# Patient Record
Sex: MALE | Race: WHITE | NOT HISPANIC OR LATINO | Employment: FULL TIME | URBAN - METROPOLITAN AREA
[De-identification: names, ages, dates, MRNs, and addresses within clinical notes are randomized per-mention and may not be internally consistent; named-entity substitution may affect disease eponyms.]

---

## 2017-10-04 ENCOUNTER — TELEPHONE (OUTPATIENT)
Dept: CARDIOLOGY | Facility: CLINIC | Age: 57
End: 2017-10-04

## 2017-10-04 NOTE — TELEPHONE ENCOUNTER
----- Message from Frantz Pedro sent at 10/4/2017 10:32 AM CDT -----  Contact: Self/ [phone]  Patient would like to be seen for a sooner appointment due to he has artery blockage he was referred by Dr. Koo.    Please call and advise.

## 2017-10-04 NOTE — TELEPHONE ENCOUNTER
Left detailed message, we can accommodate an office visit tomorrow in the [place] office.     I asked patient to call me back to the [facility] office.

## 2017-11-15 ENCOUNTER — OFFICE VISIT (OUTPATIENT)
Dept: CARDIOLOGY | Facility: CLINIC | Age: 57
End: 2017-11-15
Payer: COMMERCIAL

## 2017-11-15 VITALS
BODY MASS INDEX: 29.77 KG/M2 | DIASTOLIC BLOOD PRESSURE: 80 MMHG | HEART RATE: 62 BPM | HEIGHT: 69 IN | SYSTOLIC BLOOD PRESSURE: 160 MMHG | WEIGHT: 201 LBS

## 2017-11-15 DIAGNOSIS — E78.5 HYPERLIPIDEMIA ASSOCIATED WITH TYPE 2 DIABETES MELLITUS: ICD-10-CM

## 2017-11-15 DIAGNOSIS — I25.10 CORONARY ARTERY DISEASE INVOLVING NATIVE CORONARY ARTERY OF NATIVE HEART WITHOUT ANGINA PECTORIS: ICD-10-CM

## 2017-11-15 DIAGNOSIS — E11.9 TYPE 2 DIABETES MELLITUS WITHOUT COMPLICATION, WITHOUT LONG-TERM CURRENT USE OF INSULIN: ICD-10-CM

## 2017-11-15 DIAGNOSIS — I70.213 ATHEROSCLEROSIS OF NATIVE ARTERY OF BOTH LOWER EXTREMITIES WITH INTERMITTENT CLAUDICATION: Primary | ICD-10-CM

## 2017-11-15 DIAGNOSIS — R09.89 BILATERAL CAROTID BRUITS: ICD-10-CM

## 2017-11-15 DIAGNOSIS — I10 ESSENTIAL HYPERTENSION: ICD-10-CM

## 2017-11-15 DIAGNOSIS — R01.1 CARDIAC MURMUR: ICD-10-CM

## 2017-11-15 DIAGNOSIS — I73.9 PAD (PERIPHERAL ARTERY DISEASE): ICD-10-CM

## 2017-11-15 DIAGNOSIS — E11.69 HYPERLIPIDEMIA ASSOCIATED WITH TYPE 2 DIABETES MELLITUS: ICD-10-CM

## 2017-11-15 PROCEDURE — 99999 PR PBB SHADOW E&M-EST. PATIENT-LVL II: CPT | Mod: PBBFAC,,, | Performed by: INTERNAL MEDICINE

## 2017-11-15 PROCEDURE — 99215 OFFICE O/P EST HI 40 MIN: CPT | Mod: S$GLB,,, | Performed by: INTERNAL MEDICINE

## 2017-11-15 PROCEDURE — 93000 ELECTROCARDIOGRAM COMPLETE: CPT | Mod: S$GLB,,, | Performed by: INTERNAL MEDICINE

## 2017-11-15 RX ORDER — FERROUS SULFATE 325(65) MG
325 TABLET ORAL
COMMUNITY

## 2017-11-15 RX ORDER — CILOSTAZOL 50 MG/1
50 TABLET ORAL 2 TIMES DAILY
Qty: 180 TABLET | Refills: 3 | Status: SHIPPED | OUTPATIENT
Start: 2017-11-15 | End: 2018-11-14 | Stop reason: SDUPTHER

## 2017-11-15 RX ORDER — MULTIVITAMIN
1 TABLET ORAL DAILY
COMMUNITY

## 2017-11-15 RX ORDER — ATORVASTATIN CALCIUM 40 MG/1
40 TABLET, FILM COATED ORAL NIGHTLY
Refills: 3 | COMMUNITY
Start: 2017-08-12

## 2017-11-15 RX ORDER — ASPIRIN 81 MG/1
81 TABLET ORAL DAILY
COMMUNITY

## 2017-11-15 RX ORDER — AMLODIPINE AND BENAZEPRIL HYDROCHLORIDE 5; 20 MG/1; MG/1
1 CAPSULE ORAL DAILY
Refills: 2 | COMMUNITY
Start: 2017-10-03 | End: 2018-08-09 | Stop reason: SDUPTHER

## 2017-11-15 RX ORDER — CARVEDILOL 6.25 MG/1
6.25 TABLET ORAL 2 TIMES DAILY
Refills: 3 | COMMUNITY
Start: 2017-08-12 | End: 2018-08-09 | Stop reason: DRUGHIGH

## 2017-11-15 RX ORDER — CLOPIDOGREL BISULFATE 75 MG/1
75 TABLET ORAL DAILY
Refills: 1 | Status: ON HOLD | COMMUNITY
Start: 2017-08-19 | End: 2018-06-29 | Stop reason: HOSPADM

## 2017-11-15 RX ORDER — LANOLIN ALCOHOL/MO/W.PET/CERES
100 CREAM (GRAM) TOPICAL DAILY
COMMUNITY

## 2017-11-15 NOTE — PATIENT INSTRUCTIONS
Taking Aspirin for Atherosclerosis       Aspirin is a medicine often prescribed to treat atherosclerosis. This condition affects your arteries. These are the blood vessels that carry blood away from your heart. Having atherosclerosis means youre at greater risk of a heart attack or stroke. Aspirin can help prevent these from happening.  How atherosclerosis affects your arteries   A fatty material (plaque) can build up in your arteries. This makes it harder for blood to flow through them. A blood clot can then form on the plaque. This may block the artery, cutting off blood flow. This can cause conditions such as coronary artery disease (CAD) and peripheral arterial disease (PAD):  · CAD occurs when plaque builds up in the coronary artery. This artery supplies the heart with oxygen-rich blood.  · PAD occurs when plaque forms in leg arteries.  The same things that cause CAD and PAD can also cause plaque to form in other arteries in your body, such as those in the brain. When plaque occurs in any of these arteries, it raises your risk of heart attack or stroke.  What aspirin does  Aspirin is a blood-thinner (antiplatelet medicine). It helps keep blood clots from forming. This reduces the risk of blockage. Aspirin can be taken daily if you are at high risk of or have already had a heart attack or stroke. It is also used after a procedure called a stent placement. This is when a tiny wire mesh tube, or stent, is placed in an artery to keep it open. Aspirin helps prevent blood clots from forming on the stent.  Taking aspirin safely  Tell your healthcare provider about any medicines you are taking. This includes:  · All prescription medicines  · Over-the-counter medicines  · Herbs, vitamins, and other supplements  Also mention if you have a history of ulcers or bleeding problems. Ask whether you will need to stop taking aspirin before having surgery or dental work. Always take medicines as directed.  Tips for taking  aspirin  · Have a routine. For example, take aspirin with the same meal each day.  · Dont take more than prescribed. A low dose gives the same benefit as a higher one, with a lower risk of side effects.  · Dont skip doses. Aspirin needs to be taken each day to work well.  · Keep track of what you take. A pillbox with days of the week can help, especially if you take several medicines. Or use a list or chart to keep track.  When to call your healthcare provider  Side effects of aspirin are not usually serious. If you do have problems, changing your dose may help. Call your healthcare provider if you have any of the following:  · Excessive bruising (some bruising is normal)  · Nosebleeds, bleeding gums, or other excessive bleeding  · An upset stomach or stomach pain   Date Last Reviewed: 6/1/2016  © 0253-6362 [employer]. [address] [place] [address], [place] [address]. All rights reserved. This information is not intended as a substitute for professional medical care. Always follow your healthcare professional's instructions.

## 2017-11-15 NOTE — PROGRESS NOTES
Subjective:   Patient ID:  Shimon Cruz is a 57 y.o. male who presents for evaluation and treatment of Peripheral Arterial Disease; Claudication; Hyperlipidemia; and Hypertension      HPI:       He is here by recommendation of Dr. Ruiz for worsening lifestyle limiting len IIb claudication over the past year. No ulcers. No rest pain. No overt hair loss. He works at [employer]. He has a previous history of R leg intervention in 2008 at [facility] (? SFA and POP). He also has a h/o single vessel PCI in 2008 (unknown vessel). No angina. No TIA/CVA. He takes aspirin and plavix for DAPT. He takes statin and acei.       ECG today: NSR    Arterial ultrasound done at [place] 9/2017   No films available for review      Report states:     Bilateral CFA disease with monophasic bilateral SFA velocities    Bilateral PT high grade disease      Care team:     Dr. JULISSA Ruiz (PCP)        Patient Active Problem List    Diagnosis Date Noted    Coronary artery disease involving native coronary artery of native heart without angina pectoris 11/15/2017         S/P PCI in 2008  Unknown vessel  [facility]      Essential hypertension 11/15/2017    Type 2 diabetes mellitus without complication, without long-term current use of insulin 11/15/2017    Hyperlipidemia associated with type 2 diabetes mellitus 11/15/2017    Atherosclerosis of native artery of both lower extremities with intermittent claudication 11/15/2017    PAD (peripheral artery disease) 11/15/2017         S/p R leg PTAS in 2008  [facility]        Bilateral carotid bruits 11/15/2017    Cardiac murmur 11/15/2017           Right Arm BP - Sitting: 160/80  Left Arm BP - Sitting: 160/80                Review of Systems   Constitution: Negative for diaphoresis, weakness, night sweats, weight gain and weight loss.   HENT: Negative for congestion.    Eyes: Negative for blurred vision, discharge and double vision.   Cardiovascular: Positive for claudication. Negative  for chest pain, cyanosis, dyspnea on exertion, irregular heartbeat, leg swelling, near-syncope, orthopnea, palpitations, paroxysmal nocturnal dyspnea and syncope.   Respiratory: Negative for cough, shortness of breath and wheezing.    Endocrine: Negative for cold intolerance, heat intolerance and polyphagia.   Hematologic/Lymphatic: Negative for adenopathy and bleeding problem. Does not bruise/bleed easily.   Skin: Negative for dry skin and nail changes.   Musculoskeletal: Negative for arthritis, back pain, falls, joint pain, myalgias and neck pain.   Gastrointestinal: Negative for bloating, abdominal pain, change in bowel habit and constipation.   Genitourinary: Negative for bladder incontinence, dysuria, flank pain, genital sores and missed menses.   Neurological: Negative for aphonia, brief paralysis, difficulty with concentration and dizziness.   Psychiatric/Behavioral: Negative for altered mental status and memory loss. The patient does not have insomnia.    Allergic/Immunologic: Negative for environmental allergies.       Objective:   Physical Exam   Constitutional: He is oriented to person, place, and time. He appears well-developed and well-nourished. He is not intubated.   HENT:   Head: Normocephalic and atraumatic.   Right Ear: External ear normal.   Left Ear: External ear normal.   Mouth/Throat: Oropharynx is clear and moist.   Eyes: Conjunctivae and EOM are normal. Pupils are equal, round, and reactive to light. Right eye exhibits no discharge. Left eye exhibits no discharge. No scleral icterus.   Neck: Normal range of motion. Neck supple. Normal carotid pulses, no hepatojugular reflux and no JVD present. Carotid bruit is not present. No tracheal deviation present. No thyromegaly present.   Cardiovascular: Normal rate, regular rhythm, S1 normal and S2 normal.   No extrasystoles are present. PMI is not displaced.  Exam reveals no gallop, no S3, no distant heart sounds, no friction rub and no midsystolic  click.    Murmur heard.   Systolic murmur is present with a grade of 2/6  at the upper right sternal border radiating to the neck  Pulses:       Carotid pulses are 2+ on the right side with bruit, and 2+ on the left side with bruit.       Radial pulses are 2+ on the right side, and 2+ on the left side.        Femoral pulses are 2+ on the right side, and 2+ on the left side.       Popliteal pulses are 2+ on the right side, and 2+ on the left side.        Dorsalis pedis pulses are 0 on the right side, and 0 on the left side.        Posterior tibial pulses are 0 on the right side, and 0 on the left side.   Biphasic R PT and monophasic R DP doppler signals     Biphasic L DP and monophasic L PT doppler signals    Pulmonary/Chest: Effort normal and breath sounds normal. No accessory muscle usage or stridor. No apnea, no tachypnea and no bradypnea. He is not intubated. No respiratory distress. He has no decreased breath sounds. He has no wheezes. He has no rales. He exhibits no tenderness and no bony tenderness.   Abdominal: He exhibits no distension, no pulsatile liver, no abdominal bruit, no ascites, no pulsatile midline mass and no mass. There is no tenderness. There is no rebound and no guarding.   Musculoskeletal: Normal range of motion. He exhibits no edema or tenderness.   Lymphadenopathy:     He has no cervical adenopathy.   Neurological: He is alert and oriented to person, place, and time. He has normal reflexes. No cranial nerve deficit. Coordination normal.   Skin: Skin is warm. No rash noted. No erythema. No pallor.   Psychiatric: He has a normal mood and affect. His behavior is normal. Judgment and thought content normal.       Assessment:     1. Atherosclerosis of native artery of both lower extremities with intermittent claudication    2. Coronary artery disease involving native coronary artery of native heart without angina pectoris    3. Essential hypertension    4. Type 2 diabetes mellitus without  complication, without long-term current use of insulin    5. Hyperlipidemia associated with type 2 diabetes mellitus    6. PAD (peripheral artery disease)    7. Bilateral carotid bruits    8. Cardiac murmur        Plan:           Initiate medical therapy for PAD   Add pletal 50 mg bid    Titrate to 100 mg bid if no improvement noted   Continue with walking program      Follow up in 3 months with [facility]  If there is no improvement we will discuss revascularization        Echo to assess EF and valvular disease  Carotid ultrasound to assess patency in view of bruits        Obtain records from Dr. Mathis and [facility]  Obtain ultrasound CD from [facility] for review  Obtain recent labs from PCP       Bring BP log during visit  Check BP at least once a week        Continue with current medical plan and lifestyle changes.  Return sooner for concerns or questions. If symptoms persist go to the ED  I have reviewed all pertinent data on this patient       I have reviewed the patient's medical history in detail and updated the computerized patient record.    Orders Placed This Encounter   Procedures    Radiology US Carotid Bilateral     Standing Status:   Future     Standing Expiration Date:   11/16/2018    CAR Ankle Brachial Indices W Stress     Standing Status:   Future     Standing Expiration Date:   11/15/2018    EKG 12-lead    2D Echo w/ Color Flow Doppler     Standing Status:   Future     Standing Expiration Date:   11/15/2018       Follow up as scheduled. Return sooner for concerns or questions            He expressed verbal understanding and agreed with the plan      Greater than 50% of the visit of 45 minutes was spent counseling, educating, and coordinating the care of the patient.  -In today's visit, at least 4 established conditions that pose a risk to life or bodily function have been addressed and the conditions are severe.    -In today's visit, monitoring for drug toxicity was  accomplished.          Patient's Medications   New Prescriptions    CILOSTAZOL (PLETAL) 50 MG TAB    Take 1 tablet (50 mg total) by mouth 2 (two) times daily.   Previous Medications    AMLODIPINE-BENAZEPRIL 5-20 MG (LOTREL) 5-20 MG PER CAPSULE    Take 1 capsule by mouth once daily.    ASPIRIN (ECOTRIN) 81 MG EC TABLET    Take 81 mg by mouth once daily.    ATORVASTATIN (LIPITOR) 40 MG TABLET    Take 40 mg by mouth every evening.    CARVEDILOL (COREG) 6.25 MG TABLET    Take 6.25 mg by mouth 2 (two) times daily.    CLOPIDOGREL (PLAVIX) 75 MG TABLET    Take 75 mg by mouth once daily.    CYANOCOBALAMIN (VITAMIN B-12) 1000 MCG TABLET    Take 100 mcg by mouth once daily.    FERROUS SULFATE 325 MG (65 MG IRON) TAB TABLET    Take 325 mg by mouth daily with breakfast.    FLUARIX QUAD 2130-4917, PF, 60 MCG (15 MCG X 4)/0.5 ML VACCINE    TO BE ADMINISTERED BY PHARMASIST    MULTIVITAMIN (THERAGRAN) PER TABLET    Take 1 tablet by mouth once daily.   Modified Medications    No medications on file   Discontinued Medications    No medications on file

## 2018-01-02 ENCOUNTER — HOSPITAL ENCOUNTER (OUTPATIENT)
Dept: CARDIOLOGY | Facility: HOSPITAL | Age: 58
Discharge: HOME | End: 2018-01-02
Attending: INTERNAL MEDICINE
Payer: COMMERCIAL

## 2018-01-02 ENCOUNTER — HOSPITAL ENCOUNTER (OUTPATIENT)
Dept: RADIOLOGY | Facility: HOSPITAL | Age: 58
Discharge: HOME | End: 2018-01-02
Attending: INTERNAL MEDICINE
Payer: COMMERCIAL

## 2018-01-02 DIAGNOSIS — R09.89 BILATERAL CAROTID BRUITS: ICD-10-CM

## 2018-01-02 DIAGNOSIS — I70.213 ATHEROSCLEROSIS OF NATIVE ARTERY OF BOTH LOWER EXTREMITIES WITH INTERMITTENT CLAUDICATION: ICD-10-CM

## 2018-01-02 PROCEDURE — 93880 EXTRACRANIAL BILAT STUDY: CPT | Mod: TC,PO

## 2018-01-02 PROCEDURE — 93306 TTE W/DOPPLER COMPLETE: CPT | Mod: PO

## 2018-01-02 PROCEDURE — 93306 TTE W/DOPPLER COMPLETE: CPT | Mod: 26,,, | Performed by: INTERNAL MEDICINE

## 2018-01-03 LAB
DIASTOLIC DYSFUNCTION: NO
ESTIMATED PA SYSTOLIC PRESSURE: 13.63
MITRAL VALVE MOBILITY: NORMAL
RETIRED EF AND QEF - SEE NOTES: 60 (ref 55–65)
TRICUSPID VALVE REGURGITATION: NORMAL

## 2018-04-20 ENCOUNTER — TELEPHONE (OUTPATIENT)
Dept: CARDIOLOGY | Facility: CLINIC | Age: 58
End: 2018-04-20

## 2018-04-20 NOTE — TELEPHONE ENCOUNTER
----- Message from Janiya Mitra sent at 4/19/2018  5:06 PM CDT -----  Contact: self, [phone]  Patient requests to speak with you regarding test results from 1/2/18 and having Plavix prescription refilled. Please advise.

## 2018-04-23 ENCOUNTER — TELEPHONE (OUTPATIENT)
Dept: CARDIOLOGY | Facility: HOSPITAL | Age: 58
End: 2018-04-23

## 2018-04-23 DIAGNOSIS — I73.9 PAD (PERIPHERAL ARTERY DISEASE): Primary | ICD-10-CM

## 2018-04-23 RX ORDER — CLOPIDOGREL BISULFATE 75 MG/1
75 TABLET ORAL DAILY
Qty: 90 TABLET | Refills: 3 | Status: SHIPPED | OUTPATIENT
Start: 2018-04-23 | End: 2019-04-27 | Stop reason: SDUPTHER

## 2018-04-23 NOTE — TELEPHONE ENCOUNTER
Prescription was sent         His echo and carotid ultrasound were overall unremarkable        Thanks        ZN

## 2018-04-23 NOTE — TELEPHONE ENCOUNTER
He has 2 appointments with me        Please cancel one        He leaves in [place] we can see him there        Thanks        ZN

## 2018-04-26 ENCOUNTER — HOSPITAL ENCOUNTER (OUTPATIENT)
Dept: CARDIOLOGY | Facility: HOSPITAL | Age: 58
Discharge: HOME | End: 2018-04-26
Attending: INTERNAL MEDICINE
Payer: COMMERCIAL

## 2018-04-26 DIAGNOSIS — I70.213 ATHEROSCLEROSIS OF NATIVE ARTERY OF BOTH LOWER EXTREMITIES WITH INTERMITTENT CLAUDICATION: ICD-10-CM

## 2018-04-26 PROCEDURE — 93922 UPR/L XTREMITY ART 2 LEVELS: CPT | Mod: 26,,, | Performed by: INTERNAL MEDICINE

## 2018-04-26 PROCEDURE — 93922 UPR/L XTREMITY ART 2 LEVELS: CPT

## 2018-04-27 LAB — VASCULAR ANKLE BRACHIAL INDEX (ABI) RIGHT: 0.61 (ref 0.9–1.2)

## 2018-05-29 ENCOUNTER — OFFICE VISIT (OUTPATIENT)
Dept: CARDIOLOGY | Facility: CLINIC | Age: 58
End: 2018-05-29
Payer: COMMERCIAL

## 2018-05-29 VITALS
WEIGHT: 207 LBS | HEART RATE: 90 BPM | SYSTOLIC BLOOD PRESSURE: 142 MMHG | HEIGHT: 69 IN | DIASTOLIC BLOOD PRESSURE: 77 MMHG | BODY MASS INDEX: 30.66 KG/M2

## 2018-05-29 DIAGNOSIS — E11.69 HYPERLIPIDEMIA ASSOCIATED WITH TYPE 2 DIABETES MELLITUS: ICD-10-CM

## 2018-05-29 DIAGNOSIS — E11.9 TYPE 2 DIABETES MELLITUS WITHOUT COMPLICATION, WITHOUT LONG-TERM CURRENT USE OF INSULIN: ICD-10-CM

## 2018-05-29 DIAGNOSIS — I10 ESSENTIAL HYPERTENSION: ICD-10-CM

## 2018-05-29 DIAGNOSIS — I73.9 PAD (PERIPHERAL ARTERY DISEASE): ICD-10-CM

## 2018-05-29 DIAGNOSIS — E78.5 HYPERLIPIDEMIA ASSOCIATED WITH TYPE 2 DIABETES MELLITUS: ICD-10-CM

## 2018-05-29 DIAGNOSIS — I70.213 ATHEROSCLEROSIS OF NATIVE ARTERY OF BOTH LOWER EXTREMITIES WITH INTERMITTENT CLAUDICATION: Primary | ICD-10-CM

## 2018-05-29 DIAGNOSIS — I25.10 CORONARY ARTERY DISEASE INVOLVING NATIVE CORONARY ARTERY OF NATIVE HEART WITHOUT ANGINA PECTORIS: ICD-10-CM

## 2018-05-29 PROCEDURE — 99999 PR PBB SHADOW E&M-EST. PATIENT-LVL III: CPT | Mod: PBBFAC,,, | Performed by: INTERNAL MEDICINE

## 2018-05-29 PROCEDURE — 3078F DIAST BP <80 MM HG: CPT | Mod: CPTII,S$GLB,, | Performed by: INTERNAL MEDICINE

## 2018-05-29 PROCEDURE — 99215 OFFICE O/P EST HI 40 MIN: CPT | Mod: S$GLB,,, | Performed by: INTERNAL MEDICINE

## 2018-05-29 PROCEDURE — 3008F BODY MASS INDEX DOCD: CPT | Mod: CPTII,S$GLB,, | Performed by: INTERNAL MEDICINE

## 2018-05-29 PROCEDURE — 3077F SYST BP >= 140 MM HG: CPT | Mod: CPTII,S$GLB,, | Performed by: INTERNAL MEDICINE

## 2018-05-29 NOTE — PROGRESS NOTES
Subjective:   Patient ID:  Shimon Cruz is a 57 y.o. male who presents for evaluation and treatment of Claudication; Peripheral Arterial Disease; Hypertension; and Hyperlipidemia      HPI:       He is here by recommendation of Dr. Ruiz for worsening lifestyle limiting len IIb claudication over the past year. No ulcers. No rest pain. He works at [employer] and has difficulty walking throughout the day due to claudication.. He has a previous history of R leg intervention in  at [facility] (? SFA and POP). He also has a h/o single vessel PCI in  (unknown vessel). No angina. No TIA/CVA. He takes aspirin and plavix for DAPT. He takes statin and acei. He takes pletal 50 mg po bid.       ECG today: NSR    Arterial ultrasound done at [facility] 2017   No films available for review      Report states:     Bilateral CFA disease with monophasic bilateral SFA velocities    Bilateral PT high grade disease      [facility] 2018   R 0.61   L 0.76      Carotid US 1/018   R ICA PSV 90 cm/sec   L ICA  cm/sec    Echo 2018   Normal EF   Aortic sclerosis with MG 10 mmHg   PASP 14 mmHg            Care team:     Dr. JULISSA Ruiz (PCP)        Patient Active Problem List    Diagnosis Date Noted    Coronary artery disease involving native coronary artery of native heart without angina pectoris 11/15/2017         S/P PCI in   Unknown vessel  [facility]      Essential hypertension 11/15/2017    Type 2 diabetes mellitus without complication, without long-term current use of insulin 11/15/2017    Hyperlipidemia associated with type 2 diabetes mellitus 11/15/2017    Atherosclerosis of native artery of both lower extremities with intermittent claudication 11/15/2017    PAD (peripheral artery disease) 11/15/2017         S/p R leg PTAS in   [facility]        Bilateral carotid bruits 11/15/2017    Cardiac murmur 11/15/2017           Right Arm BP - Sittin/77  Left Arm BP - Sittin/77                Review  of Systems   Constitution: Negative for diaphoresis, weakness, night sweats, weight gain and weight loss.   HENT: Negative for congestion.    Eyes: Negative for blurred vision, discharge and double vision.   Cardiovascular: Positive for claudication. Negative for chest pain, cyanosis, dyspnea on exertion, irregular heartbeat, leg swelling, near-syncope, orthopnea, palpitations, paroxysmal nocturnal dyspnea and syncope.   Respiratory: Negative for cough, shortness of breath and wheezing.    Endocrine: Negative for cold intolerance, heat intolerance and polyphagia.   Hematologic/Lymphatic: Negative for adenopathy and bleeding problem. Does not bruise/bleed easily.   Skin: Negative for dry skin and nail changes.   Musculoskeletal: Negative for arthritis, back pain, falls, joint pain, myalgias and neck pain.   Gastrointestinal: Negative for bloating, abdominal pain, change in bowel habit and constipation.   Genitourinary: Negative for bladder incontinence, dysuria, flank pain, genital sores and missed menses.   Neurological: Negative for aphonia, brief paralysis, difficulty with concentration and dizziness.   Psychiatric/Behavioral: Negative for altered mental status and memory loss. The patient does not have insomnia.    Allergic/Immunologic: Negative for environmental allergies.       Objective:   Physical Exam   Constitutional: He is oriented to person, place, and time. He appears well-developed and well-nourished. He is not intubated.   HENT:   Head: Normocephalic and atraumatic.   Right Ear: External ear normal.   Left Ear: External ear normal.   Mouth/Throat: Oropharynx is clear and moist.   Eyes: Conjunctivae and EOM are normal. Pupils are equal, round, and reactive to light. Right eye exhibits no discharge. Left eye exhibits no discharge. No scleral icterus.   Neck: Normal range of motion. Neck supple. Normal carotid pulses, no hepatojugular reflux and no JVD present. Carotid bruit is not present. No tracheal  deviation present. No thyromegaly present.   Cardiovascular: Normal rate, regular rhythm, S1 normal and S2 normal.   No extrasystoles are present. PMI is not displaced.  Exam reveals no gallop, no S3, no distant heart sounds, no friction rub and no midsystolic click.    Murmur heard.   Systolic murmur is present with a grade of 2/6  at the upper right sternal border radiating to the neck  Pulses:       Carotid pulses are 2+ on the right side with bruit, and 2+ on the left side with bruit.       Radial pulses are 2+ on the right side, and 2+ on the left side.        Femoral pulses are 2+ on the right side, and 2+ on the left side.       Popliteal pulses are 2+ on the right side, and 2+ on the left side.        Dorsalis pedis pulses are 0 on the right side, and 0 on the left side.        Posterior tibial pulses are 0 on the right side, and 0 on the left side.   Biphasic R PT and monophasic R DP doppler signals     Biphasic L DP and monophasic L PT doppler signals    Pulmonary/Chest: Effort normal and breath sounds normal. No accessory muscle usage or stridor. No apnea, no tachypnea and no bradypnea. He is not intubated. No respiratory distress. He has no decreased breath sounds. He has no wheezes. He has no rales. He exhibits no tenderness and no bony tenderness.   Abdominal: He exhibits no distension, no pulsatile liver, no abdominal bruit, no ascites, no pulsatile midline mass and no mass. There is no tenderness. There is no rebound and no guarding.   Musculoskeletal: Normal range of motion. He exhibits no edema or tenderness.   Lymphadenopathy:     He has no cervical adenopathy.   Neurological: He is alert and oriented to person, place, and time. He has normal reflexes. No cranial nerve deficit. Coordination normal.   Skin: Skin is warm. No rash noted. No erythema. No pallor.   Psychiatric: He has a normal mood and affect. His behavior is normal. Judgment and thought content normal.       Assessment:     1.  Atherosclerosis of native artery of both lower extremities with intermittent claudication    2. PAD (peripheral artery disease)    3. Hyperlipidemia associated with type 2 diabetes mellitus    4. Essential hypertension    5. Coronary artery disease involving native coronary artery of native heart without angina pectoris    6. Type 2 diabetes mellitus without complication, without long-term current use of insulin        Plan:             Titrate to 100 mg bid  Continue with walking program      He will proceed with revascularization   R radial access   Treat R leg first then L       628/2018 or 6/29/2018   1 week off post procedure   He lifts objects at [employer]      Risks, benefits and alternatives of peripheral catheterization and possible intervention were discussed with the patient. All questions were answered and informed consent obtained.     I discussed the importance of compliance with dual antiplatelet therapy with the patient to prevent acute or late stent thrombosis with premature discontinuation of the therapy.        Will obtain an arterial ultrasound prior to procedure       Continue with aspirin + plavix for DAPT          Obtain records from Dr. Mathis and [facility]  Obtain ultrasound CD from [facility] for review        Bring BP log during visit  Check BP at least once a week        Continue with current medical plan and lifestyle changes.  Return sooner for concerns or questions. If symptoms persist go to the ED  I have reviewed all pertinent data on this patient       I have reviewed the patient's medical history in detail and updated the computerized patient record.    Orders Placed This Encounter   Procedures    US Lower Extremity Arteries Bilateral     Standing Status:   Future     Standing Expiration Date:   5/29/2019    Case Request-Cath Lab: Aortogram With Runoff     Standing Status:   Standing     Number of Occurrences:   1     Order Specific Question:   CPT Code:     Answer:   PRA  SCULARIZE FEM/POP ARTERY,ANGIOPLASTY/STENT [52555I]       Follow up as scheduled. Return sooner for concerns or questions            He expressed verbal understanding and agreed with the plan      Greater than 50% of the visit of 45 minutes was spent counseling, educating, and coordinating the care of the patient.  -In today's visit, at least 4 established conditions that pose a risk to life or bodily function have been addressed and the conditions are severe.    -In today's visit, monitoring for drug toxicity was accomplished.          Patient's Medications   New Prescriptions    No medications on file   Previous Medications    AMLODIPINE-BENAZEPRIL 5-20 MG (LOTREL) 5-20 MG PER CAPSULE    Take 1 capsule by mouth once daily.    ASPIRIN (ECOTRIN) 81 MG EC TABLET    Take 81 mg by mouth once daily.    ATORVASTATIN (LIPITOR) 40 MG TABLET    Take 40 mg by mouth every evening.    CARVEDILOL (COREG) 6.25 MG TABLET    Take 6.25 mg by mouth 2 (two) times daily.    CILOSTAZOL (PLETAL) 50 MG TAB    Take 1 tablet (50 mg total) by mouth 2 (two) times daily.    CLOPIDOGREL (PLAVIX) 75 MG TABLET    Take 75 mg by mouth once daily.    CLOPIDOGREL (PLAVIX) 75 MG TABLET    Take 1 tablet (75 mg total) by mouth once daily.    CYANOCOBALAMIN (VITAMIN B-12) 1000 MCG TABLET    Take 100 mcg by mouth once daily.    FERROUS SULFATE 325 MG (65 MG IRON) TAB TABLET    Take 325 mg by mouth daily with breakfast.    FLUARIX QUAD 1654-3798, PF, 60 MCG (15 MCG X 4)/0.5 ML VACCINE    TO BE ADMINISTERED BY PHARMASIST    MULTIVITAMIN (THERAGRAN) PER TABLET    Take 1 tablet by mouth once daily.   Modified Medications    No medications on file   Discontinued Medications    No medications on file              Addendum 7/17/2018        S/p aortogram with run off           Aorta + bilateral renal arteries are patent        Bilateral ARIEL, EIA, and CFA are patent        Ostial R SFA + mid SFA  with distal reconstitution from DFA  3 vessel run  off           Prox, mid, and distal L SFA  with 95% L POP stenosis  2 vessel run off via AT + PER        Intact plantar arches bilaterally         PTA of L POP with 6.0 x 80 mm Lutonix DCB  PTA of L SFA with 6.0 x 150 + 6.0 x 120 mm Lutonix DCB        Plan:        Aspirin  Plavix  Statin  Acei  Pletal           Return for R SFA  intervention                 7 fr 45 sheath               Navicross with Glide Advantage              PTA with 6.0 mm to high pressure              DCB or rescue stenting with 6.0 mm Supera stents

## 2018-06-04 RX ORDER — DIPHENHYDRAMINE HCL 25 MG
50 CAPSULE ORAL ONCE
Status: CANCELLED | OUTPATIENT
Start: 2018-06-05 | End: 2018-06-05

## 2018-06-04 RX ORDER — SODIUM CHLORIDE 9 MG/ML
INJECTION, SOLUTION INTRAVENOUS CONTINUOUS
Status: CANCELLED | OUTPATIENT
Start: 2018-06-05

## 2018-06-05 NOTE — PATIENT INSTRUCTIONS
Taking Aspirin for Atherosclerosis       Aspirin is a medicine often prescribed to treat atherosclerosis. This condition affects your arteries. These are the blood vessels that carry blood away from your heart. Having atherosclerosis means youre at greater risk of a heart attack or stroke. Aspirin can help prevent these from happening.  How atherosclerosis affects your arteries   A fatty material (plaque) can build up in your arteries. This makes it harder for blood to flow through them. A blood clot can then form on the plaque. This may block the artery, cutting off blood flow. This can cause conditions such as coronary artery disease (CAD) and peripheral arterial disease (PAD):  · CAD occurs when plaque builds up in the coronary artery. This artery supplies the heart with oxygen-rich blood.  · PAD occurs when plaque forms in leg arteries.  The same things that cause CAD and PAD can also cause plaque to form in other arteries in your body, such as those in the brain. When plaque occurs in any of these arteries, it raises your risk of heart attack or stroke.  What aspirin does  Aspirin is a blood-thinner (antiplatelet medicine). It helps keep blood clots from forming. This reduces the risk of blockage. Aspirin can be taken daily if you are at high risk of or have already had a heart attack or stroke. It is also used after a procedure called a stent placement. This is when a tiny wire mesh tube, or stent, is placed in an artery to keep it open. Aspirin helps prevent blood clots from forming on the stent.  Taking aspirin safely  Tell your healthcare provider about any medicines you are taking. This includes:  · All prescription medicines  · Over-the-counter medicines  · Herbs, vitamins, and other supplements  Also mention if you have a history of ulcers or bleeding problems. Ask whether you will need to stop taking aspirin before having surgery or dental work. Always take medicines as directed.  Tips for taking  aspirin  · Have a routine. For example, take aspirin with the same meal each day.  · Dont take more than prescribed. A low dose gives the same benefit as a higher one, with a lower risk of side effects.  · Dont skip doses. Aspirin needs to be taken each day to work well.  · Keep track of what you take. A pillbox with days of the week can help, especially if you take several medicines. Or use a list or chart to keep track.  When to call your healthcare provider  Side effects of aspirin are not usually serious. If you do have problems, changing your dose may help. Call your healthcare provider if you have any of the following:  · Excessive bruising (some bruising is normal)  · Nosebleeds, bleeding gums, or other excessive bleeding  · An upset stomach or stomach pain   Date Last Reviewed: 6/1/2016  © 7583-8070 [employer]. [address] [place] [address], [place] [address]. All rights reserved. This information is not intended as a substitute for professional medical care. Always follow your healthcare professional's instructions.

## 2018-06-08 NOTE — NURSING
Left voicemail at 11:30 and at 17:00 for pt to call [phone] for pre op instructions and arrival time.   Will try to call again.

## 2018-06-22 ENCOUNTER — HOSPITAL ENCOUNTER (OUTPATIENT)
Dept: RADIOLOGY | Facility: HOSPITAL | Age: 58
Discharge: HOME | End: 2018-06-22
Attending: INTERNAL MEDICINE
Payer: COMMERCIAL

## 2018-06-22 DIAGNOSIS — I70.213 ATHEROSCLEROSIS OF NATIVE ARTERY OF BOTH LOWER EXTREMITIES WITH INTERMITTENT CLAUDICATION: ICD-10-CM

## 2018-06-22 PROCEDURE — 93925 LOWER EXTREMITY STUDY: CPT | Mod: TC,PO

## 2018-06-22 NOTE — NURSING
Called and spoke with patient. Pt verbalizes full understanding of all pre-op instructions including arrival time:    07:30am Friday, June 28th,  npo after midnight except take meds morning of procedure with a small sip of water. Pt denies any questions.

## 2018-06-29 ENCOUNTER — SURGERY (OUTPATIENT)
Age: 58
End: 2018-06-29

## 2018-06-29 ENCOUNTER — HOSPITAL ENCOUNTER (OUTPATIENT)
Facility: HOSPITAL | Age: 58
Discharge: HOME | End: 2018-06-29
Attending: INTERNAL MEDICINE | Admitting: INTERNAL MEDICINE
Payer: COMMERCIAL

## 2018-06-29 VITALS
OXYGEN SATURATION: 98 % | HEIGHT: 69 IN | RESPIRATION RATE: 17 BRPM | BODY MASS INDEX: 31.1 KG/M2 | SYSTOLIC BLOOD PRESSURE: 187 MMHG | DIASTOLIC BLOOD PRESSURE: 76 MMHG | TEMPERATURE: 98 F | HEART RATE: 70 BPM | WEIGHT: 210 LBS

## 2018-06-29 DIAGNOSIS — I70.213 ATHEROSCLEROSIS OF NATIVE ARTERY OF BOTH LOWER EXTREMITIES WITH INTERMITTENT CLAUDICATION: ICD-10-CM

## 2018-06-29 DIAGNOSIS — I73.9 PERIPHERAL VASCULAR DISEASE: ICD-10-CM

## 2018-06-29 LAB
ANION GAP SERPL CALC-SCNC: 10 MMOL/L
BUN SERPL-MCNC: 24 MG/DL
CALCIUM SERPL-MCNC: 9.1 MG/DL
CHLORIDE SERPL-SCNC: 106 MMOL/L
CO2 SERPL-SCNC: 22 MMOL/L
CREAT SERPL-MCNC: 1.1 MG/DL
ERYTHROCYTE [DISTWIDTH] IN BLOOD BY AUTOMATED COUNT: 12 %
EST. GFR  (AFRICAN AMERICAN): >60 ML/MIN/1.73 M^2
EST. GFR  (NON AFRICAN AMERICAN): >60 ML/MIN/1.73 M^2
GLUCOSE SERPL-MCNC: 236 MG/DL
HCT VFR BLD AUTO: 36.9 %
HGB BLD-MCNC: 12.2 G/DL
MCH RBC QN AUTO: 33.9 PG
MCHC RBC AUTO-ENTMCNC: 33.1 G/DL
MCV RBC AUTO: 103 FL
PLATELET # BLD AUTO: 331 K/UL
PMV BLD AUTO: 9.4 FL
POC ACTIVATED CLOTTING TIME K: 219 SEC (ref 74–137)
POC ACTIVATED CLOTTING TIME K: 224 SEC (ref 74–137)
POC ACTIVATED CLOTTING TIME K: 230 SEC (ref 74–137)
POTASSIUM SERPL-SCNC: 4.7 MMOL/L
RBC # BLD AUTO: 3.6 M/UL
SAMPLE: ABNORMAL
SODIUM SERPL-SCNC: 138 MMOL/L
WBC # BLD AUTO: 10.56 K/UL

## 2018-06-29 PROCEDURE — 25000003 PHARM REV CODE 250: Performed by: INTERNAL MEDICINE

## 2018-06-29 PROCEDURE — 80048 BASIC METABOLIC PNL TOTAL CA: CPT

## 2018-06-29 PROCEDURE — 75716 ARTERY X-RAYS ARMS/LEGS: CPT | Mod: 26,59,, | Performed by: INTERNAL MEDICINE

## 2018-06-29 PROCEDURE — 93005 ELECTROCARDIOGRAM TRACING: CPT

## 2018-06-29 PROCEDURE — 25000003 PHARM REV CODE 250

## 2018-06-29 PROCEDURE — 85027 COMPLETE CBC AUTOMATED: CPT

## 2018-06-29 PROCEDURE — 36415 COLL VENOUS BLD VENIPUNCTURE: CPT

## 2018-06-29 PROCEDURE — 75625 CONTRAST EXAM ABDOMINL AORTA: CPT | Mod: 26,,, | Performed by: INTERNAL MEDICINE

## 2018-06-29 PROCEDURE — 37224 CATH LAB PROCEDURE: CPT | Mod: LT,,, | Performed by: INTERNAL MEDICINE

## 2018-06-29 PROCEDURE — 99152 MOD SED SAME PHYS/QHP 5/>YRS: CPT | Mod: ,,, | Performed by: INTERNAL MEDICINE

## 2018-06-29 PROCEDURE — 63600175 PHARM REV CODE 636 W HCPCS

## 2018-06-29 PROCEDURE — 27200085 CATH LAB PROCEDURE

## 2018-06-29 PROCEDURE — C2623 CATH, TRANSLUMIN, DRUG-COAT: HCPCS

## 2018-06-29 PROCEDURE — 25500020 PHARM REV CODE 255

## 2018-06-29 RX ORDER — HYDRALAZINE HYDROCHLORIDE 20 MG/ML
10 INJECTION INTRAMUSCULAR; INTRAVENOUS ONCE
Status: COMPLETED | OUTPATIENT
Start: 2018-06-29 | End: 2018-06-29

## 2018-06-29 RX ORDER — HYDROCODONE BITARTRATE AND ACETAMINOPHEN 5; 325 MG/1; MG/1
1 TABLET ORAL EVERY 4 HOURS PRN
Status: DISCONTINUED | OUTPATIENT
Start: 2018-06-29 | End: 2018-06-29 | Stop reason: HOSPADM

## 2018-06-29 RX ORDER — DIPHENHYDRAMINE HCL 25 MG
50 CAPSULE ORAL ONCE
Status: COMPLETED | OUTPATIENT
Start: 2018-06-30 | End: 2018-06-29

## 2018-06-29 RX ORDER — SODIUM CHLORIDE 9 MG/ML
INJECTION, SOLUTION INTRAVENOUS CONTINUOUS
Status: DISCONTINUED | OUTPATIENT
Start: 2018-06-30 | End: 2018-06-29 | Stop reason: HOSPADM

## 2018-06-29 RX ORDER — ACETAMINOPHEN 325 MG/1
650 TABLET ORAL EVERY 4 HOURS PRN
Status: DISCONTINUED | OUTPATIENT
Start: 2018-06-29 | End: 2018-06-29 | Stop reason: HOSPADM

## 2018-06-29 RX ADMIN — Medication 50 MG: at 08:06

## 2018-06-29 RX ADMIN — SODIUM CHLORIDE: 0.9 INJECTION, SOLUTION INTRAVENOUS at 08:06

## 2018-06-29 RX ADMIN — HYDRALAZINE HYDROCHLORIDE 10 MG: 20 INJECTION INTRAMUSCULAR; INTRAVENOUS at 03:06

## 2018-06-29 RX ADMIN — SODIUM CHLORIDE 3 ML/KG/HR: 0.9 INJECTION, SOLUTION INTRAVENOUS at 01:06

## 2018-06-29 NOTE — NURSING
Discharge instructions given and explained to patient and family members. IV discontinued, gelco intact, tolerated well. RUE site clean , dry, and intact no complications.RT Groin clean, dry, and intact.  V/ S stable. Patient verbalized understanding of discharge instructions. Safety maintained. Discharged home with family via private vehicle.

## 2018-06-29 NOTE — BRIEF OP NOTE
S/p aortogram with run off        Aorta + bilateral renal arteries are patent      Bilateral ARIEL, EIA, and CFA are patent      Ostial R SFA + mid SFA  with distal reconstitution from DFA  3 vessel run off        Prox, mid, and distal L SFA  with 95% L POP stenosis  2 vessel run off via AT + PER      Intact plantar arches bilaterally       PTA of L POP with 6.0 x 80 mm Lutonix DCB  PTA of L SFA with 6.0 x 150 + 6.0 x 120 mm Lutonix DCB      Plan:      Aspirin  Plavix  Statin  Acei  Pletal        Return for R SFA  intervention     7 fr 45 sheath    Navicross with [place] Advantage   PTA with 6.0 mm to high pressure   DCB or rescue stenting with 6.0 mm Supera stents

## 2018-06-29 NOTE — H&P
Subjective:   Patient ID:  Shimon Cruz is a 57 y.o. male who presents for evaluation and treatment of Claudication; Peripheral Arterial Disease; Hypertension; and Hyperlipidemia        HPI:         He is here by recommendation of Dr. Ruiz for worsening lifestyle limiting len IIb claudication over the past year. No ulcers. No rest pain. He works at [employer] and has difficulty walking throughout the day due to claudication.. He has a previous history of R leg intervention in 2008 at [facility] (? SFA and POP). He also has a h/o single vessel PCI in 2008 (unknown vessel). No angina. No TIA/CVA. He takes aspirin and plavix for DAPT. He takes statin and acei. He takes pletal 50 mg po bid.         ECG today: NSR     Arterial ultrasound done at [facility] 9/2017              No films available for review                            Report states:                            Bilateral CFA disease with monophasic bilateral SFA velocities                          Bilateral PT high grade disease        SUSANNE 4/2018              R 0.61              L 0.76        Carotid US 1/018              R ICA PSV 90 cm/sec              L ICA  cm/sec     Echo 1/2018              Normal EF              Aortic sclerosis with MG 10 mmHg              PASP 14 mmHg                 Care team:      Dr. JULISSA Ruiz (PCP)                 Patient Active Problem List     Diagnosis Date Noted    Coronary artery disease involving native coronary artery of native heart without angina pectoris 11/15/2017             S/P PCI in 2008  Unknown vessel  [facility]       Essential hypertension 11/15/2017    Type 2 diabetes mellitus without complication, without long-term current use of insulin 11/15/2017    Hyperlipidemia associated with type 2 diabetes mellitus 11/15/2017    Atherosclerosis of native artery of both lower extremities with intermittent claudication 11/15/2017    PAD (peripheral artery disease) 11/15/2017             S/p R  leg PTAS in   [place]          Bilateral carotid bruits 11/15/2017    Cardiac murmur 11/15/2017               Right Arm BP - Sittin/77  Left Arm BP - Sittin/77                       Review of Systems   Constitution: Negative for diaphoresis, weakness, night sweats, weight gain and weight loss.   HENT: Negative for congestion.    Eyes: Negative for blurred vision, discharge and double vision.   Cardiovascular: Positive for claudication. Negative for chest pain, cyanosis, dyspnea on exertion, irregular heartbeat, leg swelling, near-syncope, orthopnea, palpitations, paroxysmal nocturnal dyspnea and syncope.   Respiratory: Negative for cough, shortness of breath and wheezing.    Endocrine: Negative for cold intolerance, heat intolerance and polyphagia.   Hematologic/Lymphatic: Negative for adenopathy and bleeding problem. Does not bruise/bleed easily.   Skin: Negative for dry skin and nail changes.   Musculoskeletal: Negative for arthritis, back pain, falls, joint pain, myalgias and neck pain.   Gastrointestinal: Negative for bloating, abdominal pain, change in bowel habit and constipation.   Genitourinary: Negative for bladder incontinence, dysuria, flank pain, genital sores and missed menses.   Neurological: Negative for aphonia, brief paralysis, difficulty with concentration and dizziness.   Psychiatric/Behavioral: Negative for altered mental status and memory loss. The patient does not have insomnia.    Allergic/Immunologic: Negative for environmental allergies.         Objective:   Physical Exam   Constitutional: He is oriented to person, place, and time. He appears well-developed and well-nourished. He is not intubated.   HENT:   Head: Normocephalic and atraumatic.   Right Ear: External ear normal.   Left Ear: External ear normal.   Mouth/Throat: Oropharynx is clear and moist.   Eyes: Conjunctivae and EOM are normal. Pupils are equal, round, and reactive to light. Right eye exhibits no  discharge. Left eye exhibits no discharge. No scleral icterus.   Neck: Normal range of motion. Neck supple. Normal carotid pulses, no hepatojugular reflux and no JVD present. Carotid bruit is not present. No tracheal deviation present. No thyromegaly present.   Cardiovascular: Normal rate, regular rhythm, S1 normal and S2 normal.   No extrasystoles are present. PMI is not displaced.  Exam reveals no gallop, no S3, no distant heart sounds, no friction rub and no midsystolic click.    Murmur heard.   Systolic murmur is present with a grade of 2/6  at the upper right sternal border radiating to the neck  Pulses:       Carotid pulses are 2+ on the right side with bruit, and 2+ on the left side with bruit.       Radial pulses are 2+ on the right side, and 2+ on the left side.        Femoral pulses are 2+ on the right side, and 2+ on the left side.       Popliteal pulses are 2+ on the right side, and 2+ on the left side.        Dorsalis pedis pulses are 0 on the right side, and 0 on the left side.        Posterior tibial pulses are 0 on the right side, and 0 on the left side.   Biphasic R PT and monophasic R DP doppler signals     Biphasic L DP and monophasic L PT doppler signals    Pulmonary/Chest: Effort normal and breath sounds normal. No accessory muscle usage or stridor. No apnea, no tachypnea and no bradypnea. He is not intubated. No respiratory distress. He has no decreased breath sounds. He has no wheezes. He has no rales. He exhibits no tenderness and no bony tenderness.   Abdominal: He exhibits no distension, no pulsatile liver, no abdominal bruit, no ascites, no pulsatile midline mass and no mass. There is no tenderness. There is no rebound and no guarding.   Musculoskeletal: Normal range of motion. He exhibits no edema or tenderness.   Lymphadenopathy:     He has no cervical adenopathy.   Neurological: He is alert and oriented to person, place, and time. He has normal reflexes. No cranial nerve deficit.  Coordination normal.   Skin: Skin is warm. No rash noted. No erythema. No pallor.   Psychiatric: He has a normal mood and affect. His behavior is normal. Judgment and thought content normal.         Assessment:      1. Atherosclerosis of native artery of both lower extremities with intermittent claudication    2. PAD (peripheral artery disease)    3. Hyperlipidemia associated with type 2 diabetes mellitus    4. Essential hypertension    5. Coronary artery disease involving native coronary artery of native heart without angina pectoris    6. Type 2 diabetes mellitus without complication, without long-term current use of insulin          Plan:                  Titrate to 100 mg bid  Continue with walking program        He will proceed with revascularization              R radial access              Treat R leg first then L                    628/2018 or 6/29/2018              1 week off post procedure              He lifts objects at [employer]        Risks, benefits and alternatives of peripheral catheterization and possible intervention were discussed with the patient. All questions were answered and informed consent obtained.      I discussed the importance of compliance with dual antiplatelet therapy with the patient to prevent acute or late stent thrombosis with premature discontinuation of the therapy.           Will obtain an arterial ultrasound prior to procedure         Continue with aspirin + plavix for DAPT              Obtain records from Dr. Mathis and [facility]  Obtain ultrasound CD from [facility] for review           Bring BP log during visit  Check BP at least once a week           Continue with current medical plan and lifestyle changes.  Return sooner for concerns or questions. If symptoms persist go to the ED  I have reviewed all pertinent data on this patient         I have reviewed the patient's medical history in detail and updated the computerized patient record.           Orders Placed This  Encounter   Procedures    US Lower Extremity Arteries Bilateral       Standing Status:   Future       Standing Expiration Date:   5/29/2019    Case Request-Cath Lab: Aortogram With Runoff       Standing Status:   Standing       Number of Occurrences:   1       Order Specific Question:   CPT Code:       Answer:   PRA SCULARIZE FEM/POP ARTERY,ANGIOPLASTY/STENT [[id number]]         Follow up as scheduled. Return sooner for concerns or questions                 He expressed verbal understanding and agreed with the plan        Greater than 50% of the visit of 45 minutes was spent counseling, educating, and coordinating the care of the patient.  -In today's visit, at least 4 established conditions that pose a risk to life or bodily function have been addressed and the conditions are severe.     -In today's visit, monitoring for drug toxicity was accomplished.                   Patient's Medications   New Prescriptions     No medications on file   Previous Medications     AMLODIPINE-BENAZEPRIL 5-20 MG (LOTREL) 5-20 MG PER CAPSULE    Take 1 capsule by mouth once daily.     ASPIRIN (ECOTRIN) 81 MG EC TABLET    Take 81 mg by mouth once daily.     ATORVASTATIN (LIPITOR) 40 MG TABLET    Take 40 mg by mouth every evening.     CARVEDILOL (COREG) 6.25 MG TABLET    Take 6.25 mg by mouth 2 (two) times daily.     CILOSTAZOL (PLETAL) 50 MG TAB    Take 1 tablet (50 mg total) by mouth 2 (two) times daily.     CLOPIDOGREL (PLAVIX) 75 MG TABLET    Take 75 mg by mouth once daily.     CLOPIDOGREL (PLAVIX) 75 MG TABLET    Take 1 tablet (75 mg total) by mouth once daily.     CYANOCOBALAMIN (VITAMIN B-12) 1000 MCG TABLET    Take 100 mcg by mouth once daily.     FERROUS SULFATE 325 MG (65 MG IRON) TAB TABLET    Take 325 mg by mouth daily with breakfast.     FLUARIX QUAD 7250-3195, PF, 60 MCG (15 MCG X 4)/0.5 ML VACCINE    TO BE ADMINISTERED BY PHARMASIST     MULTIVITAMIN (THERAGRAN) PER TABLET    Take 1 tablet by mouth once daily.   Modified  Medications     No medications on file   Discontinued Medications     No medications on file

## 2018-07-05 DIAGNOSIS — I73.9 PAD (PERIPHERAL ARTERY DISEASE): Primary | ICD-10-CM

## 2018-07-05 RX ORDER — SODIUM CHLORIDE 9 MG/ML
3 INJECTION, SOLUTION INTRAVENOUS CONTINUOUS
Status: CANCELLED | OUTPATIENT
Start: 2018-07-05 | End: 2018-07-05

## 2018-07-05 NOTE — DISCHARGE SUMMARY
[facility]  Cardiology  Discharge Summary      Patient Name: Shimon Ch  MRN: [id number]  Admission Date: 6/29/2018  Hospital Length of Stay: 0 days  Discharge Date and Time: 6/29/2018  4:44 PM  Attending Physician: Humera att. providers found  Discharging Provider: ESTELA Welch, ANP  Primary Care Physician: Sabino Ruiz MD    HPI: 58yo male with history of CAD, DMII, HTN and HLP with worsening lifestyle limiting len IIb claudication over the past year. No ulcers. No rest pain. He works at [employer] and has difficulty walking throughout the day due to claudication.. He has a previous history of R leg intervention in 2008 at [facility] (? SFA and POP). He also has a h/o single vessel PCI in 2008 (unknown vessel). No angina. No TIA/CVA. He takes aspirin and plavix for DAPT. He takes statin and acei. He takes pletal 50 mg po bid.     Procedure(s) (LRB):  Aortogram With Runoff (N/A)     Indwelling Lines/Drains at time of discharge: none       Hospital Course   6/29/2018 Admitted for elective abdominal aortagram with run off due to abnormal LE ultrasound and complaints of claudication. Taken to the cath lab for the procedure with the following results:           Aorta + bilateral renal arteries are patent  Bilateral ARIEL, EIA, and CFA are patent  Ostial R SFA + mid SFA  with distal reconstitution from DFA  3 vessel run off  Prox, mid, and distal L SFA  with 95% L POP stenosis  2 vessel run off via AT + PER  Intact plantar arches bilaterally   PTA of L POP with 6.0 x 80 mm Lutonix DCB  PTA of L SFA with 6.0 x 150 + 6.0 x 120 mm Lutonix DCB  Plan:     Aspirin  Plavix  Statin  Acei  Pletal  Return for R SFA  intervention                 7 fr 45 sheath               Navicross with [place] Advantage              PTA with 6.0 mm to high pressure              DCB or rescue stenting with 6.0 mm Supera stents           Tolerated procedure well and recovered in the post cath area. Access  site remained stable and free of active bleeding, hematoma or ecchymosis. Able to ambulate without any issues. Deemed ready for discharge and sent home on current home medication and will follow up for [facility]  at later date         Pending Diagnostic Studies:     None          Final Active Diagnoses:    Diagnosis Date Noted POA    PRINCIPAL PROBLEM:  Atherosclerosis of native artery of both lower extremities with intermittent claudication [I70.213] 11/15/2017 Yes    Coronary artery disease involving native coronary artery of native heart without angina pectoris [I25.10] 11/15/2017 Yes    Essential hypertension [I10] 11/15/2017 Yes    Type 2 diabetes mellitus without complication, without long-term current use of insulin [E11.9] 11/15/2017 Yes    Hyperlipidemia associated with type 2 diabetes mellitus [E11.69, E78.5] 11/15/2017 Yes    PAD (peripheral artery disease) [I73.9] 11/15/2017 Yes    Cardiac murmur [R01.1] 11/15/2017 Yes    Bilateral carotid bruits [R09.89] 11/15/2017 Yes      Problems Resolved During this Admission:    Diagnosis Date Noted Date Resolved POA       Discharged Condition: good    Follow Up:  Follow-up Information     Grey Angela MD In 4 weeks.    Specialties:  Cardiology, INTERVENTIONAL CARDIOLOGY  Contact information:  [address] [place] [id number] [phone]                 Patient Instructions:   No discharge procedures on file.  Medications:  Reconciled Home Medications:      Medication List      CHANGE how you take these medications    clopidogrel 75 mg tablet  Commonly known as:  PLAVIX  Take 1 tablet (75 mg total) by mouth once daily.  What changed:  Another medication with the same name was removed. Continue taking this medication, and follow the directions you see here.        CONTINUE taking these medications    amlodipine-benazepril 5-20 mg 5-20 mg per capsule  Commonly known as:  LOTREL  Take 1 capsule by mouth once daily.     aspirin 81 MG EC  tablet  Commonly known as:  ECOTRIN  Take 81 mg by mouth once daily.     atorvastatin 40 MG tablet  Commonly known as:  LIPITOR  Take 40 mg by mouth every evening.     carvedilol 6.25 MG tablet  Commonly known as:  COREG  Take 6.25 mg by mouth 2 (two) times daily.     cilostazol 50 MG Tab  Commonly known as:  PLETAL  Take 1 tablet (50 mg total) by mouth 2 (two) times daily.     ferrous sulfate 325 mg (65 mg iron) Tab tablet  Take 325 mg by mouth daily with breakfast.     FLUARIX QUAD 5255-5034 (PF) 60 mcg (15 mcg x 4)/0.5 mL Syrg vaccine  Generic drug:  influenza  TO BE ADMINISTERED BY PHARMASIST     multivitamin per tablet  Commonly known as:  THERAGRAN  Take 1 tablet by mouth once daily.     VITAMIN B-12 1000 MCG tablet  Generic drug:  cyanocobalamin  Take 100 mcg by mouth once daily.            Time spent on the discharge of patient: 30 minutes    ESTELA Welch, ANP  Cardiology  [facility]

## 2018-07-11 NOTE — NURSING
Attempted to call patient.  No answer.  Call back number left on voicemail.  Will attempt to call patient later today

## 2018-07-11 NOTE — NURSING
Called and spoke with patient.  Arrival time 6:00am.  Pt verbalizes full understanding of all pre op instructions and denies questions.

## 2018-07-20 ENCOUNTER — HOSPITAL ENCOUNTER (OUTPATIENT)
Facility: HOSPITAL | Age: 58
Discharge: HOME | End: 2018-07-20
Attending: INTERNAL MEDICINE | Admitting: INTERNAL MEDICINE
Payer: COMMERCIAL

## 2018-07-20 VITALS
HEART RATE: 66 BPM | RESPIRATION RATE: 16 BRPM | HEIGHT: 69 IN | DIASTOLIC BLOOD PRESSURE: 70 MMHG | BODY MASS INDEX: 31.1 KG/M2 | SYSTOLIC BLOOD PRESSURE: 155 MMHG | TEMPERATURE: 98 F | WEIGHT: 210 LBS | OXYGEN SATURATION: 96 %

## 2018-07-20 DIAGNOSIS — I25.10 ATHEROSCLEROTIC HEART DISEASE OF NATIVE CORONARY ARTERY WITHOUT ANGINA PECTORIS: ICD-10-CM

## 2018-07-20 DIAGNOSIS — I73.9 PAD (PERIPHERAL ARTERY DISEASE): ICD-10-CM

## 2018-07-20 LAB
ANION GAP SERPL CALC-SCNC: 8 MMOL/L
APTT BLDCRRT: 24 SEC
APTT BLDCRRT: 24 SEC
BASOPHILS # BLD AUTO: 0.03 K/UL
BASOPHILS NFR BLD: 0.3 %
BUN SERPL-MCNC: 26 MG/DL
CALCIUM SERPL-MCNC: 9.1 MG/DL
CHLORIDE SERPL-SCNC: 110 MMOL/L
CO2 SERPL-SCNC: 21 MMOL/L
CREAT SERPL-MCNC: 1 MG/DL
DIFFERENTIAL METHOD: ABNORMAL
EOSINOPHIL # BLD AUTO: 0.5 K/UL
EOSINOPHIL NFR BLD: 4.7 %
ERYTHROCYTE [DISTWIDTH] IN BLOOD BY AUTOMATED COUNT: 12.2 %
EST. GFR  (AFRICAN AMERICAN): >60 ML/MIN/1.73 M^2
EST. GFR  (NON AFRICAN AMERICAN): >60 ML/MIN/1.73 M^2
GLUCOSE SERPL-MCNC: 204 MG/DL
HCT VFR BLD AUTO: 35 %
HGB BLD-MCNC: 11.9 G/DL
INR PPP: 1
INR PPP: 1
LYMPHOCYTES # BLD AUTO: 1.4 K/UL
LYMPHOCYTES NFR BLD: 14.4 %
MCH RBC QN AUTO: 34.7 PG
MCHC RBC AUTO-ENTMCNC: 34 G/DL
MCV RBC AUTO: 102 FL
MONOCYTES # BLD AUTO: 0.9 K/UL
MONOCYTES NFR BLD: 8.7 %
NEUTROPHILS # BLD AUTO: 7.1 K/UL
NEUTROPHILS NFR BLD: 71.7 %
PLATELET # BLD AUTO: 347 K/UL
PMV BLD AUTO: 9.5 FL
POC ACTIVATED CLOTTING TIME K: 191 SEC (ref 74–137)
POC ACTIVATED CLOTTING TIME K: 202 SEC (ref 74–137)
POC ACTIVATED CLOTTING TIME K: 219 SEC (ref 74–137)
POC ACTIVATED CLOTTING TIME K: 246 SEC (ref 74–137)
POC ACTIVATED CLOTTING TIME K: 246 SEC (ref 74–137)
POTASSIUM SERPL-SCNC: 5.6 MMOL/L
PROTHROMBIN TIME: 10.4 SEC
PROTHROMBIN TIME: 10.4 SEC
RBC # BLD AUTO: 3.43 M/UL
SAMPLE: ABNORMAL
SODIUM SERPL-SCNC: 139 MMOL/L
WBC # BLD AUTO: 9.82 K/UL

## 2018-07-20 PROCEDURE — 85730 THROMBOPLASTIN TIME PARTIAL: CPT

## 2018-07-20 PROCEDURE — 85025 COMPLETE CBC W/AUTO DIFF WBC: CPT

## 2018-07-20 PROCEDURE — 80048 BASIC METABOLIC PNL TOTAL CA: CPT

## 2018-07-20 PROCEDURE — 93005 ELECTROCARDIOGRAM TRACING: CPT

## 2018-07-20 PROCEDURE — 25000003 PHARM REV CODE 250: Performed by: NURSE PRACTITIONER

## 2018-07-20 PROCEDURE — 99152 MOD SED SAME PHYS/QHP 5/>YRS: CPT | Mod: ,,, | Performed by: INTERNAL MEDICINE

## 2018-07-20 PROCEDURE — 37227 CATH LAB PROCEDURE: CPT | Mod: RT,,, | Performed by: INTERNAL MEDICINE

## 2018-07-20 PROCEDURE — 63600175 PHARM REV CODE 636 W HCPCS

## 2018-07-20 PROCEDURE — 25000003 PHARM REV CODE 250

## 2018-07-20 PROCEDURE — 36415 COLL VENOUS BLD VENIPUNCTURE: CPT

## 2018-07-20 PROCEDURE — C1769 GUIDE WIRE: HCPCS

## 2018-07-20 PROCEDURE — 25500020 PHARM REV CODE 255

## 2018-07-20 PROCEDURE — 99220 PR INITIAL OBSERVATION CARE,LEVL III: CPT | Mod: ,,, | Performed by: INTERNAL MEDICINE

## 2018-07-20 PROCEDURE — 93010 ELECTROCARDIOGRAM REPORT: CPT | Mod: ,,, | Performed by: INTERNAL MEDICINE

## 2018-07-20 PROCEDURE — 85610 PROTHROMBIN TIME: CPT

## 2018-07-20 PROCEDURE — 75710 ARTERY X-RAYS ARM/LEG: CPT | Mod: 26,59,, | Performed by: INTERNAL MEDICINE

## 2018-07-20 PROCEDURE — C1724 CATH, TRANS ATHEREC,ROTATION: HCPCS

## 2018-07-20 PROCEDURE — C1874 STENT, COATED/COV W/DEL SYS: HCPCS

## 2018-07-20 PROCEDURE — 25000003 PHARM REV CODE 250: Performed by: INTERNAL MEDICINE

## 2018-07-20 RX ORDER — SODIUM CHLORIDE 9 MG/ML
3 INJECTION, SOLUTION INTRAVENOUS CONTINUOUS
Status: ACTIVE | OUTPATIENT
Start: 2018-07-20 | End: 2018-07-20

## 2018-07-20 RX ORDER — AMOXICILLIN 500 MG
1 CAPSULE ORAL DAILY
COMMUNITY

## 2018-07-20 RX ADMIN — SODIUM CHLORIDE 3 ML/KG/HR: 0.9 INJECTION, SOLUTION INTRAVENOUS at 07:07

## 2018-07-20 RX ADMIN — SODIUM CHLORIDE 3 ML/KG/HR: 0.9 INJECTION, SOLUTION INTRAVENOUS at 12:07

## 2018-07-20 NOTE — PLAN OF CARE
12:00 Patient transferred to recovery cath lab slot 5 via stretcher with side rails up x2 .  Pt drowsy but able to follow commands.  Pt is stable when connecting to cardiac monitors.  VSS.  Left groin site with gauze and tegaderm CDI. No drainage or shadowing noted. No redness, bruising, or hematoma noted around site. Dopplered ronnie pedal pulses. Palpated +2 ronnie radial pulses.  Skin is normal in color, warm to touch, < 3 sec cap refill.  Fall risk precautions given and patient acknowledges.  AIDET completed to pt.  Will continue to monitor patient.     Updated pt's wife by phone.

## 2018-07-20 NOTE — H&P
Subjective:   Patient ID:  Shimon Cruz is a 57 y.o. male who presents for evaluation and treatment of Claudication; Peripheral Arterial Disease; Hypertension; and Hyperlipidemia        HPI:         He is here by recommendation of Dr. Ruiz for worsening lifestyle limiting len IIb claudication over the past year. No ulcers. No rest pain. He works at [employer] and has difficulty walking throughout the day due to claudication.. He has a previous history of R leg intervention in 2008 at [facility] (? SFA and POP). He also has a h/o single vessel PCI in 2008 (unknown vessel). No angina. No TIA/CVA. He takes aspirin and plavix for DAPT. He takes statin and acei. He takes pletal 50 mg po bid.         ECG today: NSR     Arterial ultrasound done at [place] 9/2017              No films available for review                            Report states:                            Bilateral CFA disease with monophasic bilateral SFA velocities                          Bilateral PT high grade disease        SUSANNE 4/2018              R 0.61              L 0.76        Carotid US 1/018              R ICA PSV 90 cm/sec              L ICA  cm/sec     Echo 1/2018              Normal EF              Aortic sclerosis with MG 10 mmHg              PASP 14 mmHg                 Care team:      Dr. JULISSA Ruiz (PCP)                 Patient Active Problem List     Diagnosis Date Noted    Coronary artery disease involving native coronary artery of native heart without angina pectoris 11/15/2017             S/P PCI in 2008  Unknown vessel  [facility]       Essential hypertension 11/15/2017    Type 2 diabetes mellitus without complication, without long-term current use of insulin 11/15/2017    Hyperlipidemia associated with type 2 diabetes mellitus 11/15/2017    Atherosclerosis of native artery of both lower extremities with intermittent claudication 11/15/2017    PAD (peripheral artery disease) 11/15/2017             S/p R  leg PTAS in   [place]          Bilateral carotid bruits 11/15/2017    Cardiac murmur 11/15/2017               Right Arm BP - Sittin/77  Left Arm BP - Sittin/77                       Review of Systems   Constitution: Negative for diaphoresis, weakness, night sweats, weight gain and weight loss.   HENT: Negative for congestion.    Eyes: Negative for blurred vision, discharge and double vision.   Cardiovascular: Positive for claudication. Negative for chest pain, cyanosis, dyspnea on exertion, irregular heartbeat, leg swelling, near-syncope, orthopnea, palpitations, paroxysmal nocturnal dyspnea and syncope.   Respiratory: Negative for cough, shortness of breath and wheezing.    Endocrine: Negative for cold intolerance, heat intolerance and polyphagia.   Hematologic/Lymphatic: Negative for adenopathy and bleeding problem. Does not bruise/bleed easily.   Skin: Negative for dry skin and nail changes.   Musculoskeletal: Negative for arthritis, back pain, falls, joint pain, myalgias and neck pain.   Gastrointestinal: Negative for bloating, abdominal pain, change in bowel habit and constipation.   Genitourinary: Negative for bladder incontinence, dysuria, flank pain, genital sores and missed menses.   Neurological: Negative for aphonia, brief paralysis, difficulty with concentration and dizziness.   Psychiatric/Behavioral: Negative for altered mental status and memory loss. The patient does not have insomnia.    Allergic/Immunologic: Negative for environmental allergies.         Objective:   Physical Exam   Constitutional: He is oriented to person, place, and time. He appears well-developed and well-nourished. He is not intubated.   HENT:   Head: Normocephalic and atraumatic.   Right Ear: External ear normal.   Left Ear: External ear normal.   Mouth/Throat: Oropharynx is clear and moist.   Eyes: Conjunctivae and EOM are normal. Pupils are equal, round, and reactive to light. Right eye exhibits no  discharge. Left eye exhibits no discharge. No scleral icterus.   Neck: Normal range of motion. Neck supple. Normal carotid pulses, no hepatojugular reflux and no JVD present. Carotid bruit is not present. No tracheal deviation present. No thyromegaly present.   Cardiovascular: Normal rate, regular rhythm, S1 normal and S2 normal.   No extrasystoles are present. PMI is not displaced.  Exam reveals no gallop, no S3, no distant heart sounds, no friction rub and no midsystolic click.    Murmur heard.   Systolic murmur is present with a grade of 2/6  at the upper right sternal border radiating to the neck  Pulses:       Carotid pulses are 2+ on the right side with bruit, and 2+ on the left side with bruit.       Radial pulses are 2+ on the right side, and 2+ on the left side.        Femoral pulses are 2+ on the right side, and 2+ on the left side.       Popliteal pulses are 2+ on the right side, and 2+ on the left side.        Dorsalis pedis pulses are 0 on the right side, and 0 on the left side.        Posterior tibial pulses are 0 on the right side, and 0 on the left side.   Biphasic R PT and monophasic R DP doppler signals     Biphasic L DP and monophasic L PT doppler signals    Pulmonary/Chest: Effort normal and breath sounds normal. No accessory muscle usage or stridor. No apnea, no tachypnea and no bradypnea. He is not intubated. No respiratory distress. He has no decreased breath sounds. He has no wheezes. He has no rales. He exhibits no tenderness and no bony tenderness.   Abdominal: He exhibits no distension, no pulsatile liver, no abdominal bruit, no ascites, no pulsatile midline mass and no mass. There is no tenderness. There is no rebound and no guarding.   Musculoskeletal: Normal range of motion. He exhibits no edema or tenderness.   Lymphadenopathy:     He has no cervical adenopathy.   Neurological: He is alert and oriented to person, place, and time. He has normal reflexes. No cranial nerve deficit.  Coordination normal.   Skin: Skin is warm. No rash noted. No erythema. No pallor.   Psychiatric: He has a normal mood and affect. His behavior is normal. Judgment and thought content normal.         Assessment:      1. Atherosclerosis of native artery of both lower extremities with intermittent claudication    2. PAD (peripheral artery disease)    3. Hyperlipidemia associated with type 2 diabetes mellitus    4. Essential hypertension    5. Coronary artery disease involving native coronary artery of native heart without angina pectoris    6. Type 2 diabetes mellitus without complication, without long-term current use of insulin          Plan:                  Titrate to 100 mg bid  Continue with walking program        He will proceed with revascularization              R radial access              Treat R leg first then L                    628/2018 or 6/29/2018              1 week off post procedure              He lifts objects at [employer]        Risks, benefits and alternatives of peripheral catheterization and possible intervention were discussed with the patient. All questions were answered and informed consent obtained.      I discussed the importance of compliance with dual antiplatelet therapy with the patient to prevent acute or late stent thrombosis with premature discontinuation of the therapy.           Will obtain an arterial ultrasound prior to procedure         Continue with aspirin + plavix for DAPT              Obtain records from Dr. Mathis and [place]  Obtain ultrasound CD from [place] for review           Bring BP log during visit  Check BP at least once a week           Continue with current medical plan and lifestyle changes.  Return sooner for concerns or questions. If symptoms persist go to the ED  I have reviewed all pertinent data on this patient         I have reviewed the patient's medical history in detail and updated the computerized patient record.           Orders Placed This  Encounter   Procedures    US Lower Extremity Arteries Bilateral       Standing Status:   Future       Standing Expiration Date:   5/29/2019    Case Request-Cath Lab: Aortogram With Runoff       Standing Status:   Standing       Number of Occurrences:   1       Order Specific Question:   CPT Code:       Answer:   PRA SCULARIZE FEM/POP ARTERY,ANGIOPLASTY/STENT [10623D]         Follow up as scheduled. Return sooner for concerns or questions                 He expressed verbal understanding and agreed with the plan        Greater than 50% of the visit of 45 minutes was spent counseling, educating, and coordinating the care of the patient.  -In today's visit, at least 4 established conditions that pose a risk to life or bodily function have been addressed and the conditions are severe.     -In today's visit, monitoring for drug toxicity was accomplished.                   Patient's Medications   New Prescriptions     No medications on file   Previous Medications     AMLODIPINE-BENAZEPRIL 5-20 MG (LOTREL) 5-20 MG PER CAPSULE    Take 1 capsule by mouth once daily.     ASPIRIN (ECOTRIN) 81 MG EC TABLET    Take 81 mg by mouth once daily.     ATORVASTATIN (LIPITOR) 40 MG TABLET    Take 40 mg by mouth every evening.     CARVEDILOL (COREG) 6.25 MG TABLET    Take 6.25 mg by mouth 2 (two) times daily.     CILOSTAZOL (PLETAL) 50 MG TAB    Take 1 tablet (50 mg total) by mouth 2 (two) times daily.     CLOPIDOGREL (PLAVIX) 75 MG TABLET    Take 75 mg by mouth once daily.     CLOPIDOGREL (PLAVIX) 75 MG TABLET    Take 1 tablet (75 mg total) by mouth once daily.     CYANOCOBALAMIN (VITAMIN B-12) 1000 MCG TABLET    Take 100 mcg by mouth once daily.     FERROUS SULFATE 325 MG (65 MG IRON) TAB TABLET    Take 325 mg by mouth daily with breakfast.     FLUARIX QUAD 3423-7199, PF, 60 MCG (15 MCG X 4)/0.5 ML VACCINE    TO BE ADMINISTERED BY PHARMASIST     MULTIVITAMIN (THERAGRAN) PER TABLET    Take 1 tablet by mouth once daily.   Modified  Medications     No medications on file   Discontinued Medications     No medications on file                  Addendum 7/17/2018           S/p aortogram with run off           Aorta + bilateral renal arteries are patent        Bilateral ARIEL, EIA, and CFA are patent        Ostial R SFA + mid SFA  with distal reconstitution from DFA  3 vessel run off           Prox, mid, and distal L SFA  with 95% L POP stenosis  2 vessel run off via AT + PER        Intact plantar arches bilaterally         PTA of L POP with 6.0 x 80 mm Lutonix DCB  PTA of L SFA with 6.0 x 150 + 6.0 x 120 mm Lutonix DCB        Plan:        Aspirin  Plavix  Statin  Acei  Pletal           Return for R SFA  intervention                 7 fr 45 sheath               Nav[place]ross with [place] Advantage              PTA with 6.0 mm to high pressure              DCB or rescue stenting with 6.0 mm Supera stents

## 2018-07-20 NOTE — BRIEF OP NOTE
S/p R leg angiogram with intervention for len IIb claudication      Subtotal R SFA    Mid SFA ISR   Distal SFA 75%  3  Vessel run off  Heavily calcified vessels         Crossed R SFA with antegrade wire manipulation  Atherectomy with 2.0 Classic CSI  PTA of distal SFA and proximal SFA with 6.0 x 150 mm Lutonix DCB  PTAS of mid SFA ISR  with 7.0 x 100 mm Zilver PTX        He tolerated the procedure well

## 2018-07-20 NOTE — PLAN OF CARE
13:15 left groin gauze/tegaderm shadowing noted. Removed dressing, scant amt of bloody drainage slowly oozing from site. No hematoma noted around site, site is soft and normal in color.  Pressure applied for 5 minutes, gauze/tegaderm dressing applied using sterile technique. Left groin gauze/ tegaderm CDI, no shadowing noted. No hematoma or bruising noted around site. doplered  ronnie pedal pulses. BLE normal in color, warm to touch. VSS, pt denies any pain. Will continue to monitor pt.

## 2018-07-20 NOTE — PLAN OF CARE
16:20 Patient discharged to home as ordered after 4 hour recovery per Dr. Angela.    All discharge instructions and printed materials given.    Patient instructed on follow-up appointment as ordered.  Patient verbalized understanding and agreement with all discharge instructions given.   Pt is AAOx3, VSS, denies any pain.    Left groin gauze and tegaderm dressing c.d.i. No drainage or shadowing noted. No bleeding or hematoma noted around site. Skin normal in color and warm to touch. Palpated bilateral radial pulses and dopplered   bilateral pedal pulses.  Pt ambulated to bathroom and voided without difficulty. Assessed left groin site again. No changes from previous assessment. t got dressed and moving around without difficulty and no distress noted.  Pt in w/c.  Left AC 20 gauge iv dc'd as ordered with tip intact. sid and koban dressing applied c.d.i.  Pt discharged home via wheelchair to private vehicle by pt's wife.

## 2018-07-20 NOTE — DISCHARGE INSTRUCTIONS
Discharge Instructions:    Do not drive a car, operate heavy equipment, care for a young child, etc for the next 12-24 hours.   Avoid drinking alcohol for 24 hours.  Do not make any important decisions for 24 hours.    Drink fluids to keep hydrated. Resume your usual diet as tolerated.     Rest for today then activity as tolerated.   Do not lift anything over 5 pounds for the first 3 days after procedure.    Remove dressing tomorrow then may shower with warm soapy water. Do not scrub site. Pat dry.   May apply bandaid for 2 days.  No tub baths.  Do not submerge wound in water for 3 days.     Call MD for any unrelieved pain, excessive nausea or vomiting, redness around site, bleeding, or pus or foul smelling drainage, or any other questions or concerns.    Go to the ER for any difficulty breathing or chest pain.      If site swells or bleeds, hold direct pressure to area for 10 full minutes.   If site continues to bleed, continue to hold pressure to site and have someone bring you to the ER.      Follow any additional instructions given to you by MD.      Call MD to schedule a follow up appointment, or follow up as instructed.

## 2018-07-31 ENCOUNTER — TELEPHONE (OUTPATIENT)
Dept: CARDIOLOGY | Facility: CLINIC | Age: 58
End: 2018-07-31

## 2018-07-31 DIAGNOSIS — I73.9 PAD (PERIPHERAL ARTERY DISEASE): Primary | ICD-10-CM

## 2018-07-31 NOTE — TELEPHONE ENCOUNTER
Pt stated he developed a knot on the groin where the incision was made. Pt was advised that his message would be routed to the Dr and would f/u as soon as I can. Pt stated he understood.

## 2018-07-31 NOTE — TELEPHONE ENCOUNTER
----- Message from Janiya Manriquez sent at 7/31/2018  8:46 AM CDT -----  Contact: self, [phone] (M)  Patient states he had surgery on 7/20 and has developed a knot. Please advise.

## 2018-08-01 NOTE — TELEPHONE ENCOUNTER
----- Message from Marylou Isbell MA sent at 7/31/2018  1:49 PM CDT -----  Contact: self, [phone] (M)  Pt stated he developed a knot on the groin right where the incision was made. Please advise.  Thank you!  ----- Message -----  From: Janiya Manriquez  Sent: 7/31/2018   8:46 AM  To: Julio César [facility] Staff    Patient states he had surgery on 7/20 and has developed a knot. Please advise.

## 2018-08-02 ENCOUNTER — TELEPHONE (OUTPATIENT)
Dept: CARDIOLOGY | Facility: CLINIC | Age: 58
End: 2018-08-02

## 2018-08-02 ENCOUNTER — PATIENT MESSAGE (OUTPATIENT)
Dept: CARDIOLOGY | Facility: CLINIC | Age: 58
End: 2018-08-02

## 2018-08-02 NOTE — TELEPHONE ENCOUNTER
Pt was advised about US order that was placed and scheduled for the knot that has developed. Pt stated he understood.

## 2018-08-03 NOTE — DISCHARGE SUMMARY
[facility]  Discharge Summary      Admit Date: 7/20/2018    Discharge Date and Time: 7/20/2018    Attending Physician: Grey nAgela    Reason for Admission: revascularization    Procedures Performed: Procedure(s) (LRB):  ANGIOPLASTY, VESSEL, PERIPHERAL, FOR CHRONIC TOTAL OCCLUSION (SFA) (N/A)    Hospital Course     Narrative        Date of Procedure:  07/20/2018    A. Indication/Pre-Operative Diagnosis     The patient is a 57 year old male that was referred for catheterization by Sabino Ruiz for claudication.     B. Summary/Post-Operative Diagnosis       S/p R leg angiogram with intervention for len IIb claudication.    Subtotal ostial right SFA  + mid SFA ISR  + distal SFA 75%.    3  Vessel run off.    Heavily calcified vessels.    Crossed right SFA lesions with antegrade wire manipulation.    Atherectomy with 2.0 mm Classic CSI catheter.    PTA of distal SFA and proximal SFA with 6.0 x 150 mm Lutonix DCB.    PTAS of mid SFA ISR  with 7.0 x 100 mm Zilver PTX.    C. HPI     I have reviewed the history and physical completed on 07/20/2018. The patient has been examined and the following changes have been noted:      He is here by recommendation of Dr. Ruiz for worsening lifestyle limiting len IIb claudication over the past year. No ulcers. No rest pain. He works at [employer] and has difficulty walking throughout the day due to claudication.. He has a   previous history of R leg intervention in 2008 at [facility] (? SFA and POP). He also has a h/o single vessel PCI in 2008 (unknown vessel). No angina. No TIA/CVA. He takes aspirin and plavix for DAPT. He takes statin and acei. He takes pletal 50 mg po   bid.         ECG today: NSR     Arterial ultrasound done at [place] 9/2017              No films available for review                 Report states:                            Bilateral CFA disease with monophasic bilateral SFA velocities                          Bilateral  PT high grade disease        SUSANNE 4/2018              R 0.61              L 0.76        Addendum 7/17/2018           S/p aortogram with run off           Aorta + bilateral renal arteries are patent        Bilateral ARIEL, EIA, and CFA are patent        Ostial R SFA + mid SFA  with distal reconstitution from DFA  3 vessel run off           Prox, mid, and distal L SFA  with 95% L POP stenosis  2 vessel run off via AT + PER        Intact plantar arches bilaterally         PTA of L POP with 6.0 x 80 mm Lutonix DCB  PTA of L SFA with 6.0 x 150 + 6.0 x 120 mm Lutonix DCB    Patient history was obtained from the patient.     Counseling: The patient was counseled regarding the potential risks and benefits of this procedure, as well as possible alternative approaches to the problem and gave informed consent.    The ASA Score was Class II.     [facility] Risk Score for MACE is 1.80%. [facility] Risk Score for Death is 0.20%.     The patient had a previous angiogram at an outside facility. The films were available for review.     Height: 69 in.      Weight: 209 lbs.      BMI: 30.90 kg/m2    OUTPATIENT MEDICATIONS: Medications were reviewed.  ALLERGIES: Allergies were reviewed.    Laboratory data revealed:     07/20/2018 BUN  26, CREAT  1.0, GLU  204, HCT  35.0, HGB  11.9, INR  1.0, K  5.6, NA  139, PLT  347, PTI  10.4, WBCIR  9.82, APTT  24.0            Manual Labs:  ACT Reference Range:  sec, ACT-PLUS cartridge Cardiopulmonary Bypass: 410-440 sec, ACT-LR cartridge Indwelling Vascular sheath Removal:  sec  07/20/2018 09:09  , 09:39  , 10:00  , 10:38  , 11:06        D. Hemodynamic Results       AIR REST:  AO: 121/58 (80)    E. Angiographic Results     Diagnostic:        - Left Common Femoral Artery:             The left CFA has luminal irregularities.     - Right Common Femoral Artery:             The right CFA has luminal irregularities.     - Right Profunda Femoral  Artery:             The right PFA has luminal irregularities.     - Right Superficial Femoral Artery:             The right SFA is normal.             The proximal right SFA has subtotal (99).             The mid right SFA has chronic total occlusion within the stent. There is KAN 0 flow.             The distal right SFA has a 75% stenosis.     - Right Popliteal Artery:             The right POP has luminal irregularities.     - Right Infrapopliteal Artery:             The right infrapopliteal has luminal irregularities.     - Right Tibioperoneal Trunk:             The right TIBTRUNK has luminal irregularities.     - Right Anterior Tibial Artery:             The right AT has luminal irregularities.     - Right Peroneal Artery:             The right peroneal has luminal irregularities.     - Right Posterior Tibial Artery:             The right PT has luminal irregularities.      Intervention          Proximal Right SFA:              The lesion was successfully intervened. Post-stenosis of 0% and post-KAN 3 flow. The vessel was accessed natively.  The following items were used: Diamondback 2.0 Classic, Blln Ultraverse .018 [id number] and Blln Lutonix Dc [id number].       Mid Right SFA:              The lesion was successfully intervened. Post-stenosis of 0% and post-KAN 3 flow. The vessel was accessed natively.  The following items were used: Blln Ultraverse .014 [id number], Diamondback 2.0 Classic, Blln Ultraverse .018 [id number], Blln   Jasson 6.0 X 60 X 135cm, Blln [place] [id number] and Stent Zilver Ptx 7 X 100mm (TRISTIN).       Distal Right SFA:              The lesion was successfully intervened. Post-stenosis of 0% and post-KAN 3 flow. The vessel was accessed natively.  The following items were used: Blln Lutonix Dc [id number].    F. Details of Procedure     PROCEDURES PERFORMED: Drug Eluting Stent (1 vessel) - Peripheral, Drug Coated Balloon - Peripheral, Laser Athrectomy-Peripheral, Peripheral Filter Protection,  SFA PTA, IVUS and Unilateral Runoff    ANESTHESIA: Conscious sedation was achieved with 100 mcg of FENTANYL 100MCG/2ML and 2.5 mg of MIDAZOLAM 2 ML INJ / 10 MG. Local anesthesia was achieved with 20 ml of Lidocaine 1%. Moderate conscious sedation was performed and cardiorespiratory functions   were monitored the entire procedure by Bacilio Kelsey RN. Sedation began at 08:29 AM and concluded at 11:35 AM, totalling 185.8 minutes.     PRIMARY SURGEON: Grey Angela MD    COMPLICATIONS: There were no complications.    Medications given on sterile field: Lidocaine 1% (20 ml).    Medications given during procedure: Heparin 1000u/500cc Bag (3 bags), Midazolam 2 Ml Inj / 10 Mg (2.5 mg), Fentanyl 100mcg/2ml (100 mcg), Benadryl 50mg To (50 mg), Heparin Inj 11560f (82867 units), Phenergan 25 Mg/ml (25 mg), Hydromorphone (2 mg) and   Nitroglycerine 200mcg/ml (800 mg).    The patient was brought to the catheterization laboratory after premedication with oral Benadryl 50 mg. Bilateral groin prepped and draped. The Kit, Manifold was flushed and connected to contrast. After local anesthesia, a Sheath 4fr X 7cm Micropuncture   was inserted into the left femoral artery. Angiography performed to evaluate for closure device in the left femoral artery. The Sheath 4fr X 7cm Micropuncture was removed. A Sheath 6fr X 11cm was inserted into the left femoral artery.     ABDOMINAL AORTA    A Wire Reg Ex Angled .035 X 260 was inserted into the infra renal abdominal aorta. A Cath 4fr Yue was inserted into the infra renal abdominal aorta.     Right  SFA\ POP    The Wire Reg Ex Angled .035 X 260 was repositioned into the proximal right SFA. The Cath 4fr Yue was repositioned into the proximal right SFA. The Wire Reg Ex Angled .035 X 260 was removed. A Wire Amplatz Ss .035 X 260cm was inserted into the right POP.   The Cath 4fr Yue was removed. The Sheath 6fr X 11cm was removed.     Right  CFA    A Sheath 7fr X 45cm Destination was inserted into the  right CFA. The Wire Amplatz Ss .035 X 260cm was removed. Selective angiography performed in the right CFA and unilateral runoff performed.     Right  SFA\ POP    A Cath Navicross 035 X 135 Angle was inserted into the right SFA. A Wire Reg Ex Angled .035 X 260 was inserted into the proximal right SFA. The Wire Reg Ex Angled .035 X 260 was removed. Selective angiography performed in the right SFA. A Wire Reg Ex   Angled .035 X 260 was inserted into the mid right SFA. The Wire Reg Ex Angled .035 X 260 was removed. .018 X 300 cm glide advantage being advanced through navicross. The Cath Navicross 035 X 135 Angle was removed. .018 advantage advanced to TPT TRUNK. A   Cath 2.6fr Cxi 150cm Angled was inserted into the right POP. .018 advantage removed from navicross.     Right  PT    A Wire [place] 014 X 300cm Str Tip was inserted into the distal right PT. The Cath 2.6fr Cxi 150cm Angled was removed.     Right  SFA    The [place] [employer] [place] [address] was inserted into the distal right SFA and ultrasound performed. The [place] [employer] [place] [employer] [address] was removed. A Blln Ultraverse .014 [id number] was inserted into the mid right SFA and was inflated with   indeflator at 14.0 jessie. for 15.0 secs. The Blln Ultraverse .014 [id number] was removed.     Right  PT    A Cath 2.6fr Cxi 150cm Angled was inserted into the distal right PT. The Wire [place] 014 X 300cm Str Tip was removed. A Wire Viperwire .014 X 335 was inserted into the distal right PT. The Cath 2.6fr Cxi 150cm Angled was removed.     Right  POP\ SFA    A Emboshield Nav6 7.2 was inserted into the right POP. filter deployed. A Diamondback 2.0 Classic was inserted into the proximal right SFA and atherectomy performed. The Diamondback 2.0 Classic was repositioned into the mid right SFA and atherectomy   performed. The Diamondback 2.0 Classic was removed. A Blln Ultraverse .018 [id number] was inserted into the mid right SFA. Blln Ultraverse .018 [id number] was inflated  with indeflator at 10.0 jessie. for 5.0 mins. The Blln Ultraverse .018 [id number] was   repositioned into the proximal right SFA.     Right  PFA    A Wire [place] 014 X 300cm Str Tip was inserted into the right PFA. Blln Ultraverse .018 [id number] was inflated with indeflator at 6.0 jessie. for 6.0 mins. The Blln Ultraverse .018 [id number] was removed.     Right  SFA    A Blln Jasson 6.0 X 60 X 135cm was inserted into the mid right SFA and was inflated with indeflator at 14.0 jessie. for 4.5 mins. The Blln Jasson 6.0 X 60 X 135cm was removed. A Blln [place] [id number] was inserted into the mid right SFA and was inflated   with indeflator at 15.0 jessie. for 4.0 mins. The Blln [place] [id number] was removed. 400.0 mg of Nitroglycerine 200mcg/ml was injected intraarterial per MD. A Blln Lutonix Dc [id number] was inserted into the distal right SFA and was inflated with indeflator   at 10.0 jessie. for 3.0 mins. The Blln Lutonix Dc [id number] was removed. A Blln Lutonix Dc [id number] was inserted into the mid right SFA and was inflated with indeflator at 10.0 jessie. for 3.0 mins. The Blln Lutonix Dc [id number] was removed. A Stent Zilver   Ptx 7 X 100mm (TRISTIN) was inserted into the mid right SFA. A Stent Zilver Ptx 7 X 100mm (TRISTIN) was deployed into the mid right SFA. Delivery system removed. 400.0 mg of Nitroglycerine 200mcg/ml was injected intraarterial per MD. The Blln Jasson 6.0 X 60 X   135cm was reinserted into the mid right SFA and was inflated with indeflator at 14.0 jessie. for 2.0 mins. Blln Jasson 6.0 X 60 X 135cm was inflated with indeflator at 14.0 jessie. for 20.0 secs. The Blln [place] 6.0 X 60 X 135cm was removed. The Wire   Viperwire .014 X 335 was removed. The Emboshield Nav6 7.2 was removed. The Wire [place] 014 X 300cm Str Tip was removed.     The Sheath 7fr X 45cm Destination was removed. Total Visipaque injected was 1410.0 ml. Total Visipaque used was 150.0 ml.       Fluoroscopy Time 32 minutes   Radiation Dose 2315 mGy    Contrast Injected 1410 ml Visipaque   Contrast Used  150 ml Visipaque            Procedure log documented by EWA Pardo and verified by Grey Angela MD    ESTIMATED BLOOD LOSS is < 50 cc.    SPECIMEN: No specimen.     G. Recommendations     1. Routine post-cath care.  2. ASA 81mg.  3. Statin therapy.  4. Schedule for SUSANNE.  5. Schedule for limb ultrasound.  6. Plavix 75 mg daily    I certify that I was present for catheter insertion, catheter manipulation, angiography, angiographic interpretation, and all interventional procedures performed on this patient.     This document has been electronically    SIGNED BY: Grey Angela MD On: 07/27/2018 07:40         Consults: none     Significant Diagnostic Studies: labs, ecg, and angiogram     Final Diagnoses:    Principal Problem:     Patient Active Problem List    Diagnosis Date Noted    Atherosclerosis of native artery of both lower extremities with intermittent claudication 11/15/2017     Priority: Low    Coronary artery disease involving native coronary artery of native heart without angina pectoris 11/15/2017         S/P PCI in 2008  Unknown vessel  [place]      Essential hypertension 11/15/2017    Type 2 diabetes mellitus without complication, without long-term current use of insulin 11/15/2017    Hyperlipidemia associated with type 2 diabetes mellitus 11/15/2017    PAD (peripheral artery disease) 11/15/2017         S/p R EIA + mid SFA PTAS in 2008  [place]      Diameters   CFA 8 mm   SFA 7 mm   POP 6 mm   BTK 3/3.5 mm      SUSANNE 4/2018   R 0.61   L 0.76      Arterial ultrasound done at [facility] 9/2017                                          Report states:                            Bilateral CFA disease with monophasic bilateral SFA velocities                          Bilateral PT high grade disease        US 6/2018 [place]         R CFA pSFA mSFA dSFA pPOP dPOP PT AT DP  L CFA pSFA mSFA dSFA pPOP dPOP PT AT DP        Peripheral intervention  6/29/2018       Aorta + bilateral renal arteries are patent   Bilateral ARIEL, EIA, and CFA are patent   Ostial R SFA + mid SFA  with distal reconstitution from DFA   3 vessel run off on R foot          Prox, mid, and distal L SFA  with 95% L POP stenosis   2 vessel run off via AT + PER       Intact plantar arches bilaterally        PTA of L POP with 6.0 x 80 mm Lutonix DCB   PTA of L SFA with 6.0 x 150 + 6.0 x 120 mm Lutonix DCB        Peripheral intervention 7/20/2018       Crossed R SFA with antegrade wire manipulation   Atherectomy with 2.0 Classic CSI   PTA of distal SFA and proximal SFA with 6.0 x 150 mm Lutonix DCB   PTAS of mid SFA ISR  with 7.0 x 100 mm Zilver PTX                                         Bilateral carotid bruits 11/15/2017    Cardiac murmur 11/15/2017           Discharged Condition:  Stable     Disposition:     DC home      Follow up with PCP      Follow up with Dr. Angela or primary cardiologist as scheduled      Cardiac diet      Resume normal activities in 3 days      Follow Up/Patient Instructions:     Medications:      Discharge Medication List as of 7/20/2018  4:24 PM      CONTINUE these medications which have NOT CHANGED    Details   amlodipine-benazepril 5-20 mg (LOTREL) 5-20 mg per capsule Take 1 capsule by mouth once daily., Starting Tue 10/3/2017, Historical Med      aspirin (ECOTRIN) 81 MG EC tablet Take 81 mg by mouth once daily., Historical Med      atorvastatin (LIPITOR) 40 MG tablet Take 40 mg by mouth every evening., Starting Sat 8/12/2017, Historical Med      carvedilol (COREG) 6.25 MG tablet Take 6.25 mg by mouth 2 (two) times daily., Starting Sat 8/12/2017, Historical Med      cilostazol (PLETAL) 50 MG Tab Take 1 tablet (50 mg total) by mouth 2 (two) times daily., Starting Wed 11/15/2017, Until Thu 11/15/2018, Normal      clopidogrel (PLAVIX) 75 mg tablet Take 1 tablet (75 mg total) by mouth once daily., Starting Mon 4/23/2018, Normal      cyanocobalamin  (VITAMIN B-12) 1000 MCG tablet Take 100 mcg by mouth once daily., Historical Med      ferrous sulfate 325 mg (65 mg iron) Tab tablet Take 325 mg by mouth daily with breakfast., Historical Med      fish oil-omega-3 fatty acids 300-1,000 mg capsule Take 1 capsule by mouth once daily., Historical Med      FLUARIX QUAD 6039-3165, PF, 60 mcg (15 mcg x 4)/0.5 mL vaccine TO BE ADMINISTERED BY PHARMASIST, Historical Med      multivitamin (THERAGRAN) per tablet Take 1 tablet by mouth once daily., Historical Med              No discharge procedures on file.

## 2018-08-06 ENCOUNTER — HOSPITAL ENCOUNTER (OUTPATIENT)
Dept: RADIOLOGY | Facility: HOSPITAL | Age: 58
Discharge: HOME | End: 2018-08-06
Attending: INTERNAL MEDICINE
Payer: COMMERCIAL

## 2018-08-06 DIAGNOSIS — I73.9 PAD (PERIPHERAL ARTERY DISEASE): ICD-10-CM

## 2018-08-06 PROCEDURE — 93925 LOWER EXTREMITY STUDY: CPT | Mod: TC,PO

## 2018-08-06 NOTE — PROGRESS NOTES
Your ultrasound revealed a pseudoaneurysm which can cause pain at time      We can treat by compression it with an ultrasound probe or injected with a medication      We will get to the office to discuss the treatment option      Thanks        ZN

## 2018-08-08 ENCOUNTER — TELEPHONE (OUTPATIENT)
Dept: CARDIOLOGY | Facility: CLINIC | Age: 58
End: 2018-08-08

## 2018-08-08 NOTE — TELEPHONE ENCOUNTER
Pt advised about f/u scheduled w/ NP Ken for Thursday Aug 9 at 2:00 per Dr. Angela. Pt stated he understood.

## 2018-08-08 NOTE — TELEPHONE ENCOUNTER
----- Message from Grey Angela MD sent at 8/6/2018  4:41 PM CDT -----  Please get him to see NP this week      Thanks       ZN

## 2018-08-09 ENCOUNTER — OFFICE VISIT (OUTPATIENT)
Dept: CARDIOLOGY | Facility: CLINIC | Age: 58
End: 2018-08-09
Payer: COMMERCIAL

## 2018-08-09 VITALS
OXYGEN SATURATION: 97 % | DIASTOLIC BLOOD PRESSURE: 85 MMHG | SYSTOLIC BLOOD PRESSURE: 171 MMHG | HEIGHT: 69 IN | WEIGHT: 200 LBS | BODY MASS INDEX: 29.62 KG/M2 | HEART RATE: 74 BPM

## 2018-08-09 DIAGNOSIS — I72.9 PSEUDOANEURYSM: ICD-10-CM

## 2018-08-09 DIAGNOSIS — I72.9 PSEUDOANEURYSM: Primary | ICD-10-CM

## 2018-08-09 DIAGNOSIS — R01.1 CARDIAC MURMUR: ICD-10-CM

## 2018-08-09 DIAGNOSIS — I70.213 ATHEROSCLEROSIS OF NATIVE ARTERY OF BOTH LOWER EXTREMITIES WITH INTERMITTENT CLAUDICATION: ICD-10-CM

## 2018-08-09 DIAGNOSIS — I73.9 PAD (PERIPHERAL ARTERY DISEASE): ICD-10-CM

## 2018-08-09 DIAGNOSIS — E78.5 HYPERLIPIDEMIA ASSOCIATED WITH TYPE 2 DIABETES MELLITUS: ICD-10-CM

## 2018-08-09 DIAGNOSIS — I25.10 CORONARY ARTERY DISEASE INVOLVING NATIVE CORONARY ARTERY OF NATIVE HEART WITHOUT ANGINA PECTORIS: ICD-10-CM

## 2018-08-09 DIAGNOSIS — I10 ESSENTIAL HYPERTENSION: Primary | ICD-10-CM

## 2018-08-09 DIAGNOSIS — E11.69 HYPERLIPIDEMIA ASSOCIATED WITH TYPE 2 DIABETES MELLITUS: ICD-10-CM

## 2018-08-09 PROCEDURE — 3077F SYST BP >= 140 MM HG: CPT | Mod: CPTII,S$GLB,, | Performed by: NURSE PRACTITIONER

## 2018-08-09 PROCEDURE — 3079F DIAST BP 80-89 MM HG: CPT | Mod: CPTII,S$GLB,, | Performed by: NURSE PRACTITIONER

## 2018-08-09 PROCEDURE — 3008F BODY MASS INDEX DOCD: CPT | Mod: CPTII,S$GLB,, | Performed by: NURSE PRACTITIONER

## 2018-08-09 PROCEDURE — 99214 OFFICE O/P EST MOD 30 MIN: CPT | Mod: S$GLB,,, | Performed by: NURSE PRACTITIONER

## 2018-08-09 PROCEDURE — 99999 PR PBB SHADOW E&M-EST. PATIENT-LVL III: CPT | Mod: PBBFAC,,, | Performed by: NURSE PRACTITIONER

## 2018-08-09 RX ORDER — CARVEDILOL 25 MG/1
25 TABLET ORAL 2 TIMES DAILY WITH MEALS
Qty: 60 TABLET | Refills: 11 | Status: SHIPPED | OUTPATIENT
Start: 2018-08-09 | End: 2019-07-18 | Stop reason: SDUPTHER

## 2018-08-09 RX ORDER — AMLODIPINE AND BENAZEPRIL HYDROCHLORIDE 5; 20 MG/1; MG/1
1 CAPSULE ORAL DAILY
Qty: 90 CAPSULE | Refills: 3 | Status: SHIPPED | OUTPATIENT
Start: 2018-08-09 | End: 2019-08-08 | Stop reason: SDUPTHER

## 2018-08-09 NOTE — H&P (VIEW-ONLY)
Subjective:       Patient ID: Shimon Ch Jr. is a 57 y.o. male with Claudication; Peripheral Arterial Disease; Hypertension; and Hyperlipidemia    Chief Complaint: US follow up   Patient initially presented on 05/29/2018 for worsening lifestyle limiting len IIb claudication over the past year. No ulcers. No rest pain. He works at [employer] and has difficulty walking throughout the day due to claudication.. He has a previous history of R leg intervention in 2008 at [place] (? SFA and POP). He also has a h/o single vessel PCI in 2008 (unknown vessel). No angina. No TIA/CVA. He takes aspirin and plavix for DAPT. He takes statin and acei. He takes pletal 50 mg po bid.       Arterial ultrasound done at [facility] 9/2017              No films available for review                            Report states:                            Bilateral CFA disease with monophasic bilateral SFA velocities                          Bilateral PT high grade disease     SUSANNE 4/2018              R 0.61              L 0.76        Carotid US 1/018              R ICA PSV 90 cm/sec              L ICA  cm/sec     Echo 1/2018              Normal EF              Aortic sclerosis with MG 10 mmHg              PASP 14 mmHg   Care team:    Dr. JULISSA Ruiz (PCP)    06/29/2018 AO  Patent aorta + bilateral renal arteries.    Bilateral ARIEL, EIA, and CFA are patent.    Ostial + mid right SFA  with distal reconstitution from DFA.    3 vessel run off (right foot).    Prox, mid, and distal left SFA  with 95% POP stenosis.    2 vessel run off via AT + PER.    Intact plantar arches bilaterally.    PTA of left POP with 6.0 x 80 mm Lutonix DCB.    PTA of left SFA with 6.0 x 150 + 6.0 x 120 mm Lutonix DCB.    07/20/2018 AO  S/p R leg angiogram with intervention for len IIb claudication.    Subtotal ostial right SFA  + mid SFA ISR  + distal SFA 75%.    3  Vessel run off.    Heavily calcified vessels.    Crossed right SFA lesions  with antegrade wire manipulation.    Atherectomy with 2.0 mm Classic CSI catheter.    PTA of distal SFA and proximal SFA with 6.0 x 150 mm Lutonix DCB.    PTAS of mid SFA ISR  with 7.0 x 100 mm Zilver PTX.    07/31/2018 Patient called the office to report a knot to his right groin an US was performed on 08/01/2018 and revealed a moderate size left groin pseudoaneurysm measuring 2.0 x 2.7 x 3.0 cm.    RLE claudication markedly decreased following revascularization     He is here to review US results and schedule compression with an ultrasound probe vs injected with a medication.  US reviewed with patient and he would like to proceed as planned  Orders placed    /80 on recheck. Patient reports his SBP is never under 150  Increase Coreg to 25 mg BID  Continue all other medications   Cardiac diet  Follow up post pseudo repair          Review of Systems   Constitutional: Negative.  Negative for diaphoresis and fatigue.   HENT: Negative.    Eyes: Negative.    Respiratory: Negative for chest tightness and shortness of breath.    Cardiovascular: Negative for chest pain, palpitations and leg swelling.   Gastrointestinal: Negative.    Endocrine: Negative.    Genitourinary: Negative.    Musculoskeletal: Positive for myalgias.   Skin: Negative.    Allergic/Immunologic: Negative.    Neurological: Negative.    Hematological: Negative.    Psychiatric/Behavioral: Negative.        Objective:      Physical Exam   Constitutional: He is oriented to person, place, and time. He appears well-developed and well-nourished. No distress.   HENT:   Head: Normocephalic and atraumatic.   Eyes: Right eye exhibits no discharge. Left eye exhibits no discharge.   Neck: No JVD present.   Cardiovascular: Normal rate and regular rhythm.    Murmur heard.  Left groin mass palpated c/w pseudo    Pulmonary/Chest: Effort normal and breath sounds normal. No respiratory distress. He has no wheezes. He has no rales.   Abdominal: Soft. Bowel sounds are  normal.   Musculoskeletal: He exhibits tenderness.   Neurological: He is alert and oriented to person, place, and time.   Skin: Skin is warm and dry. Capillary refill takes less than 2 seconds. He is not diaphoretic.   Psychiatric: He has a normal mood and affect. His behavior is normal. Judgment and thought content normal.       Assessment:       1. Essential hypertension    2. Pseudoaneurysm    3. Atherosclerosis of native artery of both lower extremities with intermittent claudication    4. Coronary artery disease involving native coronary artery of native heart without angina pectoris    5. Cardiac murmur    6. Hyperlipidemia associated with type 2 diabetes mellitus    7. PAD (peripheral artery disease)        Plan:       Diagnoses and all orders for this visit:    Essential hypertension  -     carvedilol (COREG) 25 MG tablet; Take 1 tablet (25 mg total) by mouth 2 (two) times daily with meals.  -     amlodipine-benazepril 5-20 mg (LOTREL) 5-20 mg per capsule; Take 1 capsule by mouth once daily.    Pseudoaneurysm    Atherosclerosis of native artery of both lower extremities with intermittent claudication    Coronary artery disease involving native coronary artery of native heart without angina pectoris    Cardiac murmur    Hyperlipidemia associated with type 2 diabetes mellitus    PAD (peripheral artery disease)    Patient initially presented on 05/29/2018 for worsening lifestyle limiting len IIb claudication over the past year. No ulcers. No rest pain. He works at [employer] and has difficulty walking throughout the day due to claudication.. He has a previous history of R leg intervention in 2008 at [place] (? SFA and POP). He also has a h/o single vessel PCI in 2008 (unknown vessel). No angina. No TIA/CVA. He takes aspirin and plavix for DAPT. He takes statin and acei. He takes pletal 50 mg po bid.       Arterial ultrasound done at [place] 9/2017              No films available for review                             Report states:                            Bilateral CFA disease with monophasic bilateral SFA velocities                          Bilateral PT high grade disease     SUSANNE 4/2018              R 0.61              L 0.76        Carotid US 1/018              R ICA PSV 90 cm/sec              L ICA  cm/sec     Echo 1/2018              Normal EF              Aortic sclerosis with MG 10 mmHg              PASP 14 mmHg   Care team:    Dr. JULISSA Ruiz (PCP)    06/29/2018 AO  Patent aorta + bilateral renal arteries.    Bilateral ARIEL, EIA, and CFA are patent.    Ostial + mid right SFA  with distal reconstitution from DFA.    3 vessel run off (right foot).    Prox, mid, and distal left SFA  with 95% POP stenosis.    2 vessel run off via AT + PER.    Intact plantar arches bilaterally.    PTA of left POP with 6.0 x 80 mm Lutonix DCB.    PTA of left SFA with 6.0 x 150 + 6.0 x 120 mm Lutonix DCB.    07/20/2018 AO  S/p R leg angiogram with intervention for len IIb claudication.    Subtotal ostial right SFA  + mid SFA ISR  + distal SFA 75%.    3  Vessel run off.    Heavily calcified vessels.    Crossed right SFA lesions with antegrade wire manipulation.    Atherectomy with 2.0 mm Classic CSI catheter.    PTA of distal SFA and proximal SFA with 6.0 x 150 mm Lutonix DCB.    PTAS of mid SFA ISR  with 7.0 x 100 mm Zilver PTX.    07/31/2018 Patient called the office to report a knot to his right groin an US was performed on 08/01/2018 and revealed a moderate size left groin pseudoaneurysm measuring 2.0 x 2.7 x 3.0 cm.    RLE claudication markedly decreased following revascularization     He is here to review US results and schedule compression with an ultrasound probe vs injected with a medication.  US reviewed with patient and he would like to proceed as planned  Orders placed    /80 on recheck. Patient reports his SBP is never under 150  Increase Coreg to 25 mg BID  Continue all other  "medications   Cardiac diet  Follow up post pseudo repair    Vitals:    08/09/18 1334   BP: (!) 171/85   BP Location: Right arm   Patient Position: Sitting   BP Method: Large (Automatic)   Pulse: 74   SpO2: 97%   Weight: 90.7 kg (200 lb)   Height: 5' 9" (1.753 m)       Current Outpatient Prescriptions:     amlodipine-benazepril 5-20 mg (LOTREL) 5-20 mg per capsule, Take 1 capsule by mouth once daily., Disp: 90 capsule, Rfl: 3    aspirin (ECOTRIN) 81 MG EC tablet, Take 81 mg by mouth once daily., Disp: , Rfl:     atorvastatin (LIPITOR) 40 MG tablet, Take 40 mg by mouth every evening., Disp: , Rfl: 3    cilostazol (PLETAL) 50 MG Tab, Take 1 tablet (50 mg total) by mouth 2 (two) times daily., Disp: 180 tablet, Rfl: 3    clopidogrel (PLAVIX) 75 mg tablet, Take 1 tablet (75 mg total) by mouth once daily., Disp: 90 tablet, Rfl: 3    cyanocobalamin (VITAMIN B-12) 1000 MCG tablet, Take 100 mcg by mouth once daily., Disp: , Rfl:     ferrous sulfate 325 mg (65 mg iron) Tab tablet, Take 325 mg by mouth daily with breakfast., Disp: , Rfl:     fish oil-omega-3 fatty acids 300-1,000 mg capsule, Take 1 capsule by mouth once daily., Disp: , Rfl:     FLUARIX QUAD [id number], PF, 60 mcg (15 mcg x 4)/0.5 mL vaccine, TO BE ADMINISTERED BY PHARMASIST, Disp: , Rfl: 0    multivitamin (THERAGRAN) per tablet, Take 1 tablet by mouth once daily., Disp: , Rfl:     carvedilol (COREG) 25 MG tablet, Take 1 tablet (25 mg total) by mouth 2 (two) times daily with meals., Disp: 60 tablet, Rfl: 11    Results for orders placed or performed during the hospital encounter of 07/20/18   APTT   Result Value Ref Range    aPTT 24.0 21.0 - 32.0 sec   Basic metabolic panel   Result Value Ref Range    Sodium 139 136 - 145 mmol/L    Potassium 5.6 (H) 3.5 - 5.1 mmol/L    Chloride 110 95 - 110 mmol/L    CO2 21 (L) 23 - 29 mmol/L    Glucose 204 (H) 70 - 110 mg/dL    BUN, Bld 26 (H) 6 - 20 mg/dL    Creatinine 1.0 0.5 - 1.4 mg/dL    Calcium 9.1 8.7 - 10.5 " mg/dL    Anion Gap 8 8 - 16 mmol/L    eGFR if African American >60 >60 mL/min/1.73 m^2    eGFR if non African American >60 >60 mL/min/1.73 m^2   CBC auto differential   Result Value Ref Range    WBC 9.82 3.90 - 12.70 K/uL    RBC 3.43 (L) 4.60 - 6.20 M/uL    Hemoglobin 11.9 (L) 14.0 - 18.0 g/dL    Hematocrit 35.0 (L) 40.0 - 54.0 %     (H) 82 - 98 fL    MCH 34.7 (H) 27.0 - 31.0 pg    MCHC 34.0 32.0 - 36.0 g/dL    RDW 12.2 11.5 - 14.5 %    Platelets 347 150 - 350 K/uL    MPV 9.5 9.2 - 12.9 fL    Gran # (ANC) 7.1 1.8 - 7.7 K/uL    Lymph # 1.4 1.0 - 4.8 K/uL    Mono # 0.9 0.3 - 1.0 K/uL    Eos # 0.5 0.0 - 0.5 K/uL    Baso # 0.03 0.00 - 0.20 K/uL    Gran% 71.7 38.0 - 73.0 %    Lymph% 14.4 (L) 18.0 - 48.0 %    Mono% 8.7 4.0 - 15.0 %    Eosinophil% 4.7 0.0 - 8.0 %    Basophil% 0.3 0.0 - 1.9 %    Differential Method Automated    Protime-INR   Result Value Ref Range    Prothrombin Time 10.4 9.0 - 12.5 sec    INR 1.0 0.8 - 1.2   APTT   Result Value Ref Range    aPTT 24.0 21.0 - 32.0 sec   Protime-INR   Result Value Ref Range    Prothrombin Time 10.4 9.0 - 12.5 sec    INR 1.0 0.8 - 1.2   Cath lab procedure   Result Value Ref Range    Peripheral Stenosis >= 50% (A)     Peripheral Stent Yes (A)     Peripheral PTA Yes (A)    ISTAT ACT-K   Result Value Ref Range    POC ACTIVATED CLOTTING TIME K 246 (H) 74 - 137 sec    Sample ART    ISTAT ACT-K   Result Value Ref Range    POC ACTIVATED CLOTTING TIME K 219 (H) 74 - 137 sec    Sample ART    ISTAT ACT-K   Result Value Ref Range    POC ACTIVATED CLOTTING TIME K 246 (H) 74 - 137 sec    Sample ART    ISTAT ACT-K   Result Value Ref Range    POC ACTIVATED CLOTTING TIME K 202 (H) 74 - 137 sec    Sample ART    ISTAT ACT-K   Result Value Ref Range    POC ACTIVATED CLOTTING TIME K 191 (H) 74 - 137 sec    Sample ART

## 2018-08-09 NOTE — PROGRESS NOTES
Subjective:       Patient ID: Shimon Ch Jr. is a 57 y.o. male with Claudication; Peripheral Arterial Disease; Hypertension; and Hyperlipidemia    Chief Complaint: US follow up   Patient initially presented on 05/29/2018 for worsening lifestyle limiting len IIb claudication over the past year. No ulcers. No rest pain. He works at [employer] and has difficulty walking throughout the day due to claudication.. He has a previous history of R leg intervention in 2008 at [place] (? SFA and POP). He also has a h/o single vessel PCI in 2008 (unknown vessel). No angina. No TIA/CVA. He takes aspirin and plavix for DAPT. He takes statin and acei. He takes pletal 50 mg po bid.       Arterial ultrasound done at [place] 9/2017              No films available for review                            Report states:                            Bilateral CFA disease with monophasic bilateral SFA velocities                          Bilateral PT high grade disease     SUSANNE 4/2018              R 0.61              L 0.76        Carotid US 1/018              R ICA PSV 90 cm/sec              L ICA  cm/sec     Echo 1/2018              Normal EF              Aortic sclerosis with MG 10 mmHg              PASP 14 mmHg   Care team:    Dr. JULISSA Ruiz (PCP)    06/29/2018 AO  Patent aorta + bilateral renal arteries.    Bilateral ARIEL, EIA, and CFA are patent.    Ostial + mid right SFA  with distal reconstitution from DFA.    3 vessel run off (right foot).    Prox, mid, and distal left SFA  with 95% POP stenosis.    2 vessel run off via AT + PER.    Intact plantar arches bilaterally.    PTA of left POP with 6.0 x 80 mm Lutonix DCB.    PTA of left SFA with 6.0 x 150 + 6.0 x 120 mm Lutonix DCB.    07/20/2018 AO  S/p R leg angiogram with intervention for len IIb claudication.    Subtotal ostial right SFA  + mid SFA ISR  + distal SFA 75%.    3  Vessel run off.    Heavily calcified vessels.    Crossed right SFA lesions  with antegrade wire manipulation.    Atherectomy with 2.0 mm Classic CSI catheter.    PTA of distal SFA and proximal SFA with 6.0 x 150 mm Lutonix DCB.    PTAS of mid SFA ISR  with 7.0 x 100 mm Zilver PTX.    07/31/2018 Patient called the office to report a knot to his right groin an US was performed on 08/01/2018 and revealed a moderate size left groin pseudoaneurysm measuring 2.0 x 2.7 x 3.0 cm.    RLE claudication markedly decreased following revascularization     He is here to review US results and schedule compression with an ultrasound probe vs injected with a medication.  US reviewed with patient and he would like to proceed as planned  Orders placed    /80 on recheck. Patient reports his SBP is never under 150  Increase Coreg to 25 mg BID  Continue all other medications   Cardiac diet  Follow up post pseudo repair          Review of Systems   Constitutional: Negative.  Negative for diaphoresis and fatigue.   HENT: Negative.    Eyes: Negative.    Respiratory: Negative for chest tightness and shortness of breath.    Cardiovascular: Negative for chest pain, palpitations and leg swelling.   Gastrointestinal: Negative.    Endocrine: Negative.    Genitourinary: Negative.    Musculoskeletal: Positive for myalgias.   Skin: Negative.    Allergic/Immunologic: Negative.    Neurological: Negative.    Hematological: Negative.    Psychiatric/Behavioral: Negative.        Objective:      Physical Exam   Constitutional: He is oriented to person, place, and time. He appears well-developed and well-nourished. No distress.   HENT:   Head: Normocephalic and atraumatic.   Eyes: Right eye exhibits no discharge. Left eye exhibits no discharge.   Neck: No JVD present.   Cardiovascular: Normal rate and regular rhythm.    Murmur heard.  Left groin mass palpated c/w pseudo    Pulmonary/Chest: Effort normal and breath sounds normal. No respiratory distress. He has no wheezes. He has no rales.   Abdominal: Soft. Bowel sounds are  normal.   Musculoskeletal: He exhibits tenderness.   Neurological: He is alert and oriented to person, place, and time.   Skin: Skin is warm and dry. Capillary refill takes less than 2 seconds. He is not diaphoretic.   Psychiatric: He has a normal mood and affect. His behavior is normal. Judgment and thought content normal.       Assessment:       1. Essential hypertension    2. Pseudoaneurysm    3. Atherosclerosis of native artery of both lower extremities with intermittent claudication    4. Coronary artery disease involving native coronary artery of native heart without angina pectoris    5. Cardiac murmur    6. Hyperlipidemia associated with type 2 diabetes mellitus    7. PAD (peripheral artery disease)        Plan:       Diagnoses and all orders for this visit:    Essential hypertension  -     carvedilol (COREG) 25 MG tablet; Take 1 tablet (25 mg total) by mouth 2 (two) times daily with meals.  -     amlodipine-benazepril 5-20 mg (LOTREL) 5-20 mg per capsule; Take 1 capsule by mouth once daily.    Pseudoaneurysm    Atherosclerosis of native artery of both lower extremities with intermittent claudication    Coronary artery disease involving native coronary artery of native heart without angina pectoris    Cardiac murmur    Hyperlipidemia associated with type 2 diabetes mellitus    PAD (peripheral artery disease)    Patient initially presented on 05/29/2018 for worsening lifestyle limiting len IIb claudication over the past year. No ulcers. No rest pain. He works at [employer] and has difficulty walking throughout the day due to claudication.. He has a previous history of R leg intervention in 2008 at [place] (? SFA and POP). He also has a h/o single vessel PCI in 2008 (unknown vessel). No angina. No TIA/CVA. He takes aspirin and plavix for DAPT. He takes statin and acei. He takes pletal 50 mg po bid.       Arterial ultrasound done at [place] 9/2017              No films available for review                             Report states:                            Bilateral CFA disease with monophasic bilateral SFA velocities                          Bilateral PT high grade disease     SUSANNE 4/2018              R 0.61              L 0.76        Carotid US 1/018              R ICA PSV 90 cm/sec              L ICA  cm/sec     Echo 1/2018              Normal EF              Aortic sclerosis with MG 10 mmHg              PASP 14 mmHg   Care team:    Dr. JULISSA Ruiz (PCP)    06/29/2018 AO  Patent aorta + bilateral renal arteries.    Bilateral ARIEL, EIA, and CFA are patent.    Ostial + mid right SFA  with distal reconstitution from DFA.    3 vessel run off (right foot).    Prox, mid, and distal left SFA  with 95% POP stenosis.    2 vessel run off via AT + PER.    Intact plantar arches bilaterally.    PTA of left POP with 6.0 x 80 mm Lutonix DCB.    PTA of left SFA with 6.0 x 150 + 6.0 x 120 mm Lutonix DCB.    07/20/2018 AO  S/p R leg angiogram with intervention for len IIb claudication.    Subtotal ostial right SFA  + mid SFA ISR  + distal SFA 75%.    3  Vessel run off.    Heavily calcified vessels.    Crossed right SFA lesions with antegrade wire manipulation.    Atherectomy with 2.0 mm Classic CSI catheter.    PTA of distal SFA and proximal SFA with 6.0 x 150 mm Lutonix DCB.    PTAS of mid SFA ISR  with 7.0 x 100 mm Zilver PTX.    07/31/2018 Patient called the office to report a knot to his right groin an US was performed on 08/01/2018 and revealed a moderate size left groin pseudoaneurysm measuring 2.0 x 2.7 x 3.0 cm.    RLE claudication markedly decreased following revascularization     He is here to review US results and schedule compression with an ultrasound probe vs injected with a medication.  US reviewed with patient and he would like to proceed as planned  Orders placed    /80 on recheck. Patient reports his SBP is never under 150  Increase Coreg to 25 mg BID  Continue all other  "medications   Cardiac diet  Follow up post pseudo repair    Vitals:    08/09/18 1334   BP: (!) 171/85   BP Location: Right arm   Patient Position: Sitting   BP Method: Large (Automatic)   Pulse: 74   SpO2: 97%   Weight: 90.7 kg (200 lb)   Height: 5' 9" (1.753 m)       Current Outpatient Prescriptions:     amlodipine-benazepril 5-20 mg (LOTREL) 5-20 mg per capsule, Take 1 capsule by mouth once daily., Disp: 90 capsule, Rfl: 3    aspirin (ECOTRIN) 81 MG EC tablet, Take 81 mg by mouth once daily., Disp: , Rfl:     atorvastatin (LIPITOR) 40 MG tablet, Take 40 mg by mouth every evening., Disp: , Rfl: 3    cilostazol (PLETAL) 50 MG Tab, Take 1 tablet (50 mg total) by mouth 2 (two) times daily., Disp: 180 tablet, Rfl: 3    clopidogrel (PLAVIX) 75 mg tablet, Take 1 tablet (75 mg total) by mouth once daily., Disp: 90 tablet, Rfl: 3    cyanocobalamin (VITAMIN B-12) 1000 MCG tablet, Take 100 mcg by mouth once daily., Disp: , Rfl:     ferrous sulfate 325 mg (65 mg iron) Tab tablet, Take 325 mg by mouth daily with breakfast., Disp: , Rfl:     fish oil-omega-3 fatty acids 300-1,000 mg capsule, Take 1 capsule by mouth once daily., Disp: , Rfl:     FLUARIX QUAD 3960-1168, PF, 60 mcg (15 mcg x 4)/0.5 mL vaccine, TO BE ADMINISTERED BY PHARMASIST, Disp: , Rfl: 0    multivitamin (THERAGRAN) per tablet, Take 1 tablet by mouth once daily., Disp: , Rfl:     carvedilol (COREG) 25 MG tablet, Take 1 tablet (25 mg total) by mouth 2 (two) times daily with meals., Disp: 60 tablet, Rfl: 11    Results for orders placed or performed during the hospital encounter of 07/20/18   APTT   Result Value Ref Range    aPTT 24.0 21.0 - 32.0 sec   Basic metabolic panel   Result Value Ref Range    Sodium 139 136 - 145 mmol/L    Potassium 5.6 (H) 3.5 - 5.1 mmol/L    Chloride 110 95 - 110 mmol/L    CO2 21 (L) 23 - 29 mmol/L    Glucose 204 (H) 70 - 110 mg/dL    BUN, Bld 26 (H) 6 - 20 mg/dL    Creatinine 1.0 0.5 - 1.4 mg/dL    Calcium 9.1 8.7 - 10.5 " mg/dL    Anion Gap 8 8 - 16 mmol/L    eGFR if African American >60 >60 mL/min/1.73 m^2    eGFR if non African American >60 >60 mL/min/1.73 m^2   CBC auto differential   Result Value Ref Range    WBC 9.82 3.90 - 12.70 K/uL    RBC 3.43 (L) 4.60 - 6.20 M/uL    Hemoglobin 11.9 (L) 14.0 - 18.0 g/dL    Hematocrit 35.0 (L) 40.0 - 54.0 %     (H) 82 - 98 fL    MCH 34.7 (H) 27.0 - 31.0 pg    MCHC 34.0 32.0 - 36.0 g/dL    RDW 12.2 11.5 - 14.5 %    Platelets 347 150 - 350 K/uL    MPV 9.5 9.2 - 12.9 fL    Gran # (ANC) 7.1 1.8 - 7.7 K/uL    Lymph # 1.4 1.0 - 4.8 K/uL    Mono # 0.9 0.3 - 1.0 K/uL    Eos # 0.5 0.0 - 0.5 K/uL    Baso # 0.03 0.00 - 0.20 K/uL    Gran% 71.7 38.0 - 73.0 %    Lymph% 14.4 (L) 18.0 - 48.0 %    Mono% 8.7 4.0 - 15.0 %    Eosinophil% 4.7 0.0 - 8.0 %    Basophil% 0.3 0.0 - 1.9 %    Differential Method Automated    Protime-INR   Result Value Ref Range    Prothrombin Time 10.4 9.0 - 12.5 sec    INR 1.0 0.8 - 1.2   APTT   Result Value Ref Range    aPTT 24.0 21.0 - 32.0 sec   Protime-INR   Result Value Ref Range    Prothrombin Time 10.4 9.0 - 12.5 sec    INR 1.0 0.8 - 1.2   Cath lab procedure   Result Value Ref Range    Peripheral Stenosis >= 50% (A)     Peripheral Stent Yes (A)     Peripheral PTA Yes (A)    ISTAT ACT-K   Result Value Ref Range    POC ACTIVATED CLOTTING TIME K 246 (H) 74 - 137 sec    Sample ART    ISTAT ACT-K   Result Value Ref Range    POC ACTIVATED CLOTTING TIME K 219 (H) 74 - 137 sec    Sample ART    ISTAT ACT-K   Result Value Ref Range    POC ACTIVATED CLOTTING TIME K 246 (H) 74 - 137 sec    Sample ART    ISTAT ACT-K   Result Value Ref Range    POC ACTIVATED CLOTTING TIME K 202 (H) 74 - 137 sec    Sample ART    ISTAT ACT-K   Result Value Ref Range    POC ACTIVATED CLOTTING TIME K 191 (H) 74 - 137 sec    Sample ART

## 2018-08-10 NOTE — NURSING
Called and spoke with patient. Pt verbalizes full understanding of all pre-op instructions including arrival time:    06:00am  Tuesday, August 14th,  npo after midnight except take meds morning of procedure with a small sip of water. Pt denies any questions.

## 2018-08-14 ENCOUNTER — HOSPITAL ENCOUNTER (OUTPATIENT)
Facility: HOSPITAL | Age: 58
Discharge: HOME | End: 2018-08-14
Attending: INTERNAL MEDICINE | Admitting: INTERNAL MEDICINE
Payer: COMMERCIAL

## 2018-08-14 VITALS
HEART RATE: 62 BPM | SYSTOLIC BLOOD PRESSURE: 149 MMHG | RESPIRATION RATE: 15 BRPM | WEIGHT: 200 LBS | BODY MASS INDEX: 29.62 KG/M2 | HEIGHT: 69 IN | TEMPERATURE: 99 F | OXYGEN SATURATION: 99 % | DIASTOLIC BLOOD PRESSURE: 72 MMHG

## 2018-08-14 DIAGNOSIS — I72.9 PSEUDOANEURYSM: ICD-10-CM

## 2018-08-14 DIAGNOSIS — I25.10 ATHEROSCLEROTIC HEART DISEASE OF NATIVE CORONARY ARTERY WITHOUT ANGINA PECTORIS: ICD-10-CM

## 2018-08-14 PROCEDURE — 63600175 PHARM REV CODE 636 W HCPCS: Performed by: INTERNAL MEDICINE

## 2018-08-14 PROCEDURE — 36002 PSEUDOANEURYSM INJECTION TRT: CPT | Mod: LT,,, | Performed by: INTERNAL MEDICINE

## 2018-08-14 PROCEDURE — 36002 PSEUDOANEURYSM INJECTION TRT: CPT | Mod: LT

## 2018-08-14 PROCEDURE — 25000003 PHARM REV CODE 250

## 2018-08-14 PROCEDURE — 76942 ECHO GUIDE FOR BIOPSY: CPT | Mod: 26,,, | Performed by: INTERNAL MEDICINE

## 2018-08-14 RX ORDER — METFORMIN HYDROCHLORIDE 500 MG/1
750 TABLET ORAL 2 TIMES DAILY WITH MEALS
COMMUNITY

## 2018-08-14 RX ADMIN — THROMBIN, TOPICAL (BOVINE) 1000 UNITS: KIT at 09:08

## 2018-08-14 NOTE — INTERVAL H&P NOTE
The patient has been examined and the H&P has been reviewed:        Anesthesia/Surgery risks, benefits and alternative options discussed and understood by patient/family.          Active Hospital Problems    Diagnosis  POA    Pseudoaneurysm [I72.9]  Yes      Resolved Hospital Problems   No resolved problems to display.         He is here for pseudoaneurysm repair

## 2018-08-14 NOTE — DISCHARGE INSTRUCTIONS
Remove dressing tomorrow after shower.  May apply bandaid for 2 days  If radial, do not use arm at all for remainder of day  No strenuous activity or exercise for 3 days following procedure  No tub bath, Do not submerge wound in water for 3 days  Do not lift anything over 5lbs for 3 days after procedure  If site swells or bleeds, hold direct pressure to area for 10 full minutes.   (if site continues to bleed, have someone bring you to the ER)

## 2018-08-14 NOTE — PLAN OF CARE
09:45 Pt disconnected from cardiac monitors. VSS. Left groin gauze/tegaderm dressing remains CDI. No drainage or shadowing noted. Pt moving around and dressing without difficulty. No IV site. No sedation given for left groin injection procedure. Pt verbalized understanding of discharge instructions. Copy was given.  Pt escorted out hospital to home.

## 2018-08-14 NOTE — PLAN OF CARE
09:30  Pt resting in bed post left groin injection with . VSS. Left groin gauze/tegaderm dressing CDI. No drainage or shadowing noted. AIDET completed to pt again.

## 2018-08-14 NOTE — BRIEF OP NOTE
He had a L CFA pseudoaneurysm repair with thrombin injection  He tolerated the procedure well      DC home   Resume work in am      Follow up as scheduled

## 2018-09-11 LAB
PERIPHERAL PTA: YES
PERIPHERAL STENOSIS: ABNORMAL
PERIPHERAL STENT: YES

## 2018-09-13 LAB
PERIPHERAL PTA: YES
PERIPHERAL STENOSIS: ABNORMAL

## 2018-11-14 DIAGNOSIS — I70.213 ATHEROSCLEROSIS OF NATIVE ARTERY OF BOTH LOWER EXTREMITIES WITH INTERMITTENT CLAUDICATION: ICD-10-CM

## 2018-11-15 RX ORDER — CILOSTAZOL 50 MG/1
50 TABLET ORAL 2 TIMES DAILY
Qty: 180 TABLET | Refills: 3 | Status: SHIPPED | OUTPATIENT
Start: 2018-11-15 | End: 2019-10-03 | Stop reason: SDUPTHER

## 2019-04-27 DIAGNOSIS — I73.9 PAD (PERIPHERAL ARTERY DISEASE): ICD-10-CM

## 2019-04-27 RX ORDER — CLOPIDOGREL BISULFATE 75 MG/1
75 TABLET ORAL DAILY
Qty: 90 TABLET | Refills: 3 | Status: SHIPPED | OUTPATIENT
Start: 2019-04-27 | End: 2020-06-03

## 2019-07-18 DIAGNOSIS — I10 ESSENTIAL HYPERTENSION: ICD-10-CM

## 2019-07-18 RX ORDER — CARVEDILOL 25 MG/1
TABLET ORAL
Qty: 180 TABLET | Refills: 3 | Status: ON HOLD | OUTPATIENT
Start: 2019-07-18 | End: 2019-09-10 | Stop reason: HOSPADM

## 2019-08-08 DIAGNOSIS — I10 ESSENTIAL HYPERTENSION: ICD-10-CM

## 2019-08-08 RX ORDER — AMLODIPINE AND BENAZEPRIL HYDROCHLORIDE 5; 20 MG/1; MG/1
CAPSULE ORAL
Qty: 90 CAPSULE | Refills: 3 | Status: SHIPPED | OUTPATIENT
Start: 2019-08-08 | End: 2019-11-20 | Stop reason: DRUGHIGH

## 2019-09-08 ENCOUNTER — NURSE TRIAGE (OUTPATIENT)
Dept: ADMINISTRATIVE | Facility: CLINIC | Age: 59
End: 2019-09-08

## 2019-09-08 ENCOUNTER — HOSPITAL ENCOUNTER (OUTPATIENT)
Facility: HOSPITAL | Age: 59
Discharge: HOME | End: 2019-09-10
Attending: EMERGENCY MEDICINE | Admitting: HOSPITALIST
Payer: COMMERCIAL

## 2019-09-08 DIAGNOSIS — N17.9 AKI (ACUTE KIDNEY INJURY): ICD-10-CM

## 2019-09-08 DIAGNOSIS — I44.30 AVB (ATRIOVENTRICULAR BLOCK): ICD-10-CM

## 2019-09-08 DIAGNOSIS — R07.9 CHEST PAIN: ICD-10-CM

## 2019-09-08 DIAGNOSIS — R00.1 BRADYCARDIA: ICD-10-CM

## 2019-09-08 DIAGNOSIS — I44.1 2ND DEGREE AV BLOCK: Primary | ICD-10-CM

## 2019-09-08 DIAGNOSIS — I44.1 AV BLOCK, 2ND DEGREE: ICD-10-CM

## 2019-09-08 DIAGNOSIS — R07.9 CHEST PAIN, UNSPECIFIED TYPE: ICD-10-CM

## 2019-09-08 DIAGNOSIS — I50.9 CONGESTIVE HEART FAILURE, UNSPECIFIED HF CHRONICITY, UNSPECIFIED HEART FAILURE TYPE: ICD-10-CM

## 2019-09-08 LAB
ALBUMIN SERPL BCP-MCNC: 4.3 G/DL (ref 3.5–5.2)
ALP SERPL-CCNC: 73 U/L (ref 38–126)
ALT SERPL W/O P-5'-P-CCNC: 25 U/L (ref 10–44)
ANION GAP SERPL CALC-SCNC: 11 MMOL/L (ref 8–16)
AST SERPL-CCNC: 25 U/L (ref 15–46)
BASOPHILS # BLD AUTO: 0.03 K/UL (ref 0–0.2)
BASOPHILS NFR BLD: 0.4 % (ref 0–1.9)
BILIRUB SERPL-MCNC: 0.4 MG/DL (ref 0.1–1)
BUN SERPL-MCNC: 36 MG/DL (ref 2–20)
CALCIUM SERPL-MCNC: 9.1 MG/DL (ref 8.7–10.5)
CHLORIDE SERPL-SCNC: 105 MMOL/L (ref 95–110)
CO2 SERPL-SCNC: 21 MMOL/L (ref 23–29)
CREAT SERPL-MCNC: 2.34 MG/DL (ref 0.5–1.4)
DIFFERENTIAL METHOD: ABNORMAL
EOSINOPHIL # BLD AUTO: 0.3 K/UL (ref 0–0.5)
EOSINOPHIL NFR BLD: 3.8 % (ref 0–8)
ERYTHROCYTE [DISTWIDTH] IN BLOOD BY AUTOMATED COUNT: 11.9 % (ref 11.5–14.5)
EST. GFR  (AFRICAN AMERICAN): 33.7 ML/MIN/1.73 M^2
EST. GFR  (NON AFRICAN AMERICAN): 29.1 ML/MIN/1.73 M^2
ESTIMATED AVG GLUCOSE: 160 MG/DL (ref 68–131)
GLUCOSE SERPL-MCNC: 149 MG/DL (ref 70–110)
HBA1C MFR BLD HPLC: 7.2 % (ref 4–5.6)
HCT VFR BLD AUTO: 36.7 % (ref 40–54)
HGB BLD-MCNC: 12.3 G/DL (ref 14–18)
LYMPHOCYTES # BLD AUTO: 1.5 K/UL (ref 1–4.8)
LYMPHOCYTES NFR BLD: 22.3 % (ref 18–48)
MCH RBC QN AUTO: 34.6 PG (ref 27–31)
MCHC RBC AUTO-ENTMCNC: 33.5 G/DL (ref 32–36)
MCV RBC AUTO: 103 FL (ref 82–98)
MONOCYTES # BLD AUTO: 0.9 K/UL (ref 0.3–1)
MONOCYTES NFR BLD: 13.4 % (ref 4–15)
NEUTROPHILS # BLD AUTO: 4.1 K/UL (ref 1.8–7.7)
NEUTROPHILS NFR BLD: 60.1 % (ref 38–73)
NT-PROBNP: 1270 PG/ML (ref 5–900)
PLATELET # BLD AUTO: 270 K/UL (ref 150–350)
PMV BLD AUTO: 10.1 FL (ref 9.2–12.9)
POTASSIUM SERPL-SCNC: 5.3 MMOL/L (ref 3.5–5.1)
PROT SERPL-MCNC: 7.6 G/DL (ref 6–8.4)
RBC # BLD AUTO: 3.56 M/UL (ref 4.6–6.2)
SODIUM SERPL-SCNC: 137 MMOL/L (ref 136–145)
TROPONIN I SERPL DL<=0.01 NG/ML-MCNC: 0.01 NG/ML (ref 0.01–0.03)
TROPONIN I SERPL DL<=0.01 NG/ML-MCNC: 0.02 NG/ML (ref 0–0.03)
WBC # BLD AUTO: 6.78 K/UL (ref 3.9–12.7)

## 2019-09-08 PROCEDURE — 83036 HEMOGLOBIN GLYCOSYLATED A1C: CPT

## 2019-09-08 PROCEDURE — 99283 EMERGENCY DEPT VISIT LOW MDM: CPT | Mod: 25,ER

## 2019-09-08 PROCEDURE — 84484 ASSAY OF TROPONIN QUANT: CPT | Mod: ER

## 2019-09-08 PROCEDURE — 11000001 HC ACUTE MED/SURG PRIVATE ROOM

## 2019-09-08 PROCEDURE — 93005 ELECTROCARDIOGRAM TRACING: CPT

## 2019-09-08 PROCEDURE — 80053 COMPREHEN METABOLIC PANEL: CPT | Mod: ER

## 2019-09-08 PROCEDURE — 93010 ELECTROCARDIOGRAM REPORT: CPT | Mod: ,,, | Performed by: INTERNAL MEDICINE

## 2019-09-08 PROCEDURE — 85610 PROTHROMBIN TIME: CPT

## 2019-09-08 PROCEDURE — G0378 HOSPITAL OBSERVATION PER HR: HCPCS

## 2019-09-08 PROCEDURE — 25000003 PHARM REV CODE 250: Mod: ER | Performed by: EMERGENCY MEDICINE

## 2019-09-08 PROCEDURE — 36415 COLL VENOUS BLD VENIPUNCTURE: CPT

## 2019-09-08 PROCEDURE — 84484 ASSAY OF TROPONIN QUANT: CPT | Mod: 91

## 2019-09-08 PROCEDURE — 93005 ELECTROCARDIOGRAM TRACING: CPT | Mod: ER

## 2019-09-08 PROCEDURE — 85025 COMPLETE CBC W/AUTO DIFF WBC: CPT | Mod: ER

## 2019-09-08 PROCEDURE — 25000003 PHARM REV CODE 250: Performed by: EMERGENCY MEDICINE

## 2019-09-08 PROCEDURE — 83880 ASSAY OF NATRIURETIC PEPTIDE: CPT | Mod: ER

## 2019-09-08 PROCEDURE — 93010 EKG 12-LEAD: ICD-10-PCS | Mod: ,,, | Performed by: INTERNAL MEDICINE

## 2019-09-08 RX ORDER — POLYETHYLENE GLYCOL 3350 17 G/17G
17 POWDER, FOR SOLUTION ORAL DAILY
Status: DISCONTINUED | OUTPATIENT
Start: 2019-09-09 | End: 2019-09-10 | Stop reason: HOSPADM

## 2019-09-08 RX ORDER — CILOSTAZOL 50 MG/1
50 TABLET ORAL 2 TIMES DAILY
Status: DISCONTINUED | OUTPATIENT
Start: 2019-09-10 | End: 2019-09-08

## 2019-09-08 RX ORDER — INSULIN ASPART 100 [IU]/ML
0-5 INJECTION, SOLUTION INTRAVENOUS; SUBCUTANEOUS EVERY 6 HOURS PRN
Status: DISCONTINUED | OUTPATIENT
Start: 2019-09-08 | End: 2019-09-08

## 2019-09-08 RX ORDER — GLUCAGON 1 MG
1 KIT INJECTION
Status: DISCONTINUED | OUTPATIENT
Start: 2019-09-08 | End: 2019-09-10 | Stop reason: HOSPADM

## 2019-09-08 RX ORDER — CILOSTAZOL 50 MG/1
50 TABLET ORAL 2 TIMES DAILY
Status: DISCONTINUED | OUTPATIENT
Start: 2019-09-08 | End: 2019-09-08

## 2019-09-08 RX ORDER — ACETAMINOPHEN 325 MG/1
650 TABLET ORAL EVERY 4 HOURS PRN
Status: DISCONTINUED | OUTPATIENT
Start: 2019-09-08 | End: 2019-09-10 | Stop reason: HOSPADM

## 2019-09-08 RX ORDER — BENAZEPRIL HYDROCHLORIDE 20 MG/1
20 TABLET ORAL DAILY
Status: DISCONTINUED | OUTPATIENT
Start: 2019-09-09 | End: 2019-09-08

## 2019-09-08 RX ORDER — SODIUM CHLORIDE 0.9 % (FLUSH) 0.9 %
10 SYRINGE (ML) INJECTION
Status: DISCONTINUED | OUTPATIENT
Start: 2019-09-08 | End: 2019-09-08 | Stop reason: SDUPTHER

## 2019-09-08 RX ORDER — CILOSTAZOL 50 MG/1
50 TABLET ORAL 2 TIMES DAILY
Status: DISCONTINUED | OUTPATIENT
Start: 2019-09-09 | End: 2019-09-10 | Stop reason: HOSPADM

## 2019-09-08 RX ORDER — CLOPIDOGREL BISULFATE 75 MG/1
75 TABLET ORAL DAILY
Status: DISCONTINUED | OUTPATIENT
Start: 2019-09-09 | End: 2019-09-08

## 2019-09-08 RX ORDER — AMLODIPINE AND BENAZEPRIL HYDROCHLORIDE 5; 20 MG/1; MG/1
1 CAPSULE ORAL DAILY
Status: DISCONTINUED | OUTPATIENT
Start: 2019-09-09 | End: 2019-09-08

## 2019-09-08 RX ORDER — ACETAMINOPHEN 325 MG/1
650 TABLET ORAL EVERY 8 HOURS PRN
Status: DISCONTINUED | OUTPATIENT
Start: 2019-09-08 | End: 2019-09-10 | Stop reason: HOSPADM

## 2019-09-08 RX ORDER — CLOPIDOGREL BISULFATE 75 MG/1
75 TABLET ORAL DAILY
Status: DISCONTINUED | OUTPATIENT
Start: 2019-09-10 | End: 2019-09-08

## 2019-09-08 RX ORDER — IBUPROFEN 200 MG
16 TABLET ORAL
Status: DISCONTINUED | OUTPATIENT
Start: 2019-09-08 | End: 2019-09-10 | Stop reason: HOSPADM

## 2019-09-08 RX ORDER — CLOPIDOGREL BISULFATE 75 MG/1
75 TABLET ORAL DAILY
Status: DISCONTINUED | OUTPATIENT
Start: 2019-09-09 | End: 2019-09-10 | Stop reason: HOSPADM

## 2019-09-08 RX ORDER — AMLODIPINE BESYLATE 5 MG/1
5 TABLET ORAL DAILY
Status: DISCONTINUED | OUTPATIENT
Start: 2019-09-10 | End: 2019-09-10 | Stop reason: HOSPADM

## 2019-09-08 RX ORDER — ONDANSETRON 2 MG/ML
4 INJECTION INTRAMUSCULAR; INTRAVENOUS EVERY 8 HOURS PRN
Status: DISCONTINUED | OUTPATIENT
Start: 2019-09-08 | End: 2019-09-10 | Stop reason: HOSPADM

## 2019-09-08 RX ORDER — INSULIN ASPART 100 [IU]/ML
0-5 INJECTION, SOLUTION INTRAVENOUS; SUBCUTANEOUS
Status: DISCONTINUED | OUTPATIENT
Start: 2019-09-08 | End: 2019-09-10 | Stop reason: HOSPADM

## 2019-09-08 RX ORDER — IBUPROFEN 200 MG
24 TABLET ORAL
Status: DISCONTINUED | OUTPATIENT
Start: 2019-09-08 | End: 2019-09-10 | Stop reason: HOSPADM

## 2019-09-08 RX ORDER — SODIUM CHLORIDE 0.9 % (FLUSH) 0.9 %
10 SYRINGE (ML) INJECTION
Status: DISCONTINUED | OUTPATIENT
Start: 2019-09-08 | End: 2019-09-10 | Stop reason: HOSPADM

## 2019-09-08 RX ORDER — ASPIRIN 81 MG/1
81 TABLET ORAL DAILY
Status: DISCONTINUED | OUTPATIENT
Start: 2019-09-09 | End: 2019-09-10 | Stop reason: HOSPADM

## 2019-09-08 RX ORDER — NAPROXEN SODIUM 220 MG/1
324 TABLET, FILM COATED ORAL
Status: COMPLETED | OUTPATIENT
Start: 2019-09-08 | End: 2019-09-08

## 2019-09-08 RX ORDER — RAMELTEON 8 MG/1
8 TABLET ORAL NIGHTLY PRN
Status: DISCONTINUED | OUTPATIENT
Start: 2019-09-08 | End: 2019-09-10 | Stop reason: HOSPADM

## 2019-09-08 RX ORDER — AMLODIPINE BESYLATE 5 MG/1
5 TABLET ORAL DAILY
Status: DISCONTINUED | OUTPATIENT
Start: 2019-09-09 | End: 2019-09-08

## 2019-09-08 RX ORDER — AMLODIPINE BESYLATE 5 MG/1
5 TABLET ORAL DAILY
Status: DISCONTINUED | OUTPATIENT
Start: 2019-09-10 | End: 2019-09-08

## 2019-09-08 RX ORDER — ATORVASTATIN CALCIUM 40 MG/1
40 TABLET, FILM COATED ORAL NIGHTLY
Status: DISCONTINUED | OUTPATIENT
Start: 2019-09-08 | End: 2019-09-10 | Stop reason: HOSPADM

## 2019-09-08 RX ORDER — ASPIRIN 81 MG/1
81 TABLET ORAL DAILY
Status: DISCONTINUED | OUTPATIENT
Start: 2019-09-10 | End: 2019-09-08

## 2019-09-08 RX ORDER — ASPIRIN 81 MG/1
81 TABLET ORAL DAILY
Status: DISCONTINUED | OUTPATIENT
Start: 2019-09-09 | End: 2019-09-08

## 2019-09-08 RX ORDER — BENAZEPRIL HYDROCHLORIDE 20 MG/1
20 TABLET ORAL DAILY
Status: DISCONTINUED | OUTPATIENT
Start: 2019-09-10 | End: 2019-09-10 | Stop reason: HOSPADM

## 2019-09-08 RX ADMIN — ASPIRIN 81 MG 324 MG: 81 TABLET ORAL at 11:09

## 2019-09-08 RX ADMIN — ATORVASTATIN CALCIUM 40 MG: 40 TABLET, FILM COATED ORAL at 08:09

## 2019-09-08 NOTE — PLAN OF CARE
VN cued into pt's room for introduction with pt's permission.  VN role explained and informed pt that VN would be working with bedside nurse and rest of care team.  Fall risk and bed alarm protocol education provided.  Instructed pt to call for assistance.  Pt aware and agreeable.  Allowed time for questions.  No acute distress noted.  Will continue to be available as needed.

## 2019-09-08 NOTE — SUBJECTIVE & OBJECTIVE
Past Medical History:   Diagnosis Date    Coronary artery disease     Diabetes mellitus     Hyperlipidemia     Hypertension        History reviewed. No pertinent surgical history.    Review of patient's allergies indicates:  No Known Allergies    No current facility-administered medications on file prior to encounter.      Current Outpatient Medications on File Prior to Encounter   Medication Sig    amlodipine-benazepril 5-20 mg (LOTREL) 5-20 mg per capsule TAKE 1 CAPSULE BY MOUTH EVERY DAY    aspirin (ECOTRIN) 81 MG EC tablet Take 81 mg by mouth once daily.    atorvastatin (LIPITOR) 40 MG tablet Take 40 mg by mouth every evening.    carvedilol (COREG) 25 MG tablet TAKE 1 TABLET BY MOUTH TWICE A DAY WITH MEALS    cilostazol (PLETAL) 50 MG Tab TAKE 1 TABLET (50 MG TOTAL) BY MOUTH 2 (TWO) TIMES DAILY.    clopidogrel (PLAVIX) 75 mg tablet TAKE 1 TABLET (75 MG TOTAL) BY MOUTH ONCE DAILY.    cyanocobalamin (VITAMIN B-12) 1000 MCG tablet Take 100 mcg by mouth once daily.    ferrous sulfate 325 mg (65 mg iron) Tab tablet Take 325 mg by mouth daily with breakfast.    fish oil-omega-3 fatty acids 300-1,000 mg capsule Take 1 capsule by mouth once daily.    metFORMIN (GLUCOPHAGE) 500 MG tablet Take 750 mg by mouth 2 (two) times daily with meals.     multivitamin (THERAGRAN) per tablet Take 1 tablet by mouth once daily.    FLUARIX QUAD 9440-0016, PF, 60 mcg (15 mcg x 4)/0.5 mL vaccine TO BE ADMINISTERED BY PHARMASIST     Family History     None        Tobacco Use    Smoking status: Former Smoker     Last attempt to quit: 2006     Years since quittin.2   Substance and Sexual Activity    Alcohol use: Yes     Comment: mixed drinks daily    Drug use: No    Sexual activity: Not on file     Review of Systems   Constitutional: Negative for activity change, chills, fatigue and fever.   HENT: Negative for congestion and nosebleeds.    Eyes: Negative for photophobia and visual disturbance.   Respiratory:  Positive for chest tightness. Negative for shortness of breath.    Cardiovascular: Negative for chest pain, palpitations and leg swelling.   Gastrointestinal: Negative for abdominal distention, blood in stool, nausea and vomiting.   Genitourinary: Negative for difficulty urinating and hematuria.   Musculoskeletal: Negative for arthralgias and myalgias.   Neurological: Negative for dizziness, tremors and numbness.   Psychiatric/Behavioral: Negative for agitation and confusion. The patient is not nervous/anxious.      Objective:     Vital Signs (Most Recent):  Temp: 98.1 °F (36.7 °C) (09/08/19 1606)  Pulse: (!) 37 (09/08/19 1706)  Resp: 16 (09/08/19 1602)  BP: (!) 158/70 (09/08/19 1602)  SpO2: 96 % (09/08/19 1602) Vital Signs (24h Range):  Temp:  [98.1 °F (36.7 °C)-98.6 °F (37 °C)] 98.1 °F (36.7 °C)  Pulse:  [32-42] 37  Resp:  [13-24] 16  SpO2:  [93 %-98 %] 96 %  BP: (127-200)/(60-90) 158/70     Weight: 95.3 kg (210 lb)  Body mass index is 31.01 kg/m².    Physical Exam   Constitutional: He is oriented to person, place, and time. He appears well-developed and well-nourished.   HENT:   Head: Normocephalic and atraumatic.   Eyes: EOM are normal.   Neck: Normal range of motion. Neck supple. No JVD present.   Cardiovascular: Normal rate and regular rhythm.   Pulmonary/Chest: Effort normal.   Abdominal: Soft. Bowel sounds are normal. He exhibits no distension. There is no guarding.   Musculoskeletal: Normal range of motion. He exhibits no edema or deformity.   Neurological: He is alert and oriented to person, place, and time.   Psychiatric: He has a normal mood and affect. His behavior is normal. Judgment and thought content normal.   Nursing note and vitals reviewed.        CRANIAL NERVES     CN III, IV, VI   Extraocular motions are normal.        Significant Labs: .  Recent Labs   Lab 09/08/19  1142   WBC 6.78   HGB 12.3*   HCT 36.7*        Recent Labs   Lab 09/08/19  1142      K 5.3*      CO2 21*   BUN  36*   CREATININE 2.34*   *   CALCIUM 9.1     Recent Labs   Lab 09/08/19  1142   ALKPHOS 73   ALT 25   AST 25   ALBUMIN 4.3   PROT 7.6   BILITOT 0.4      Recent Labs     09/08/19  1142   TROPONINI 0.014     No results for input(s): POCTGLUCOSE in the last 168 hours.  No results found for: HGBA1C  Scheduled Meds:   [START ON 9/10/2019] benazepril  20 mg Oral Daily    And    [START ON 9/10/2019] amLODIPine  5 mg Oral Daily    [START ON 9/9/2019] aspirin  81 mg Oral Daily    atorvastatin  40 mg Oral QHS    [START ON 9/9/2019] cilostazol  50 mg Oral BID    [START ON 9/9/2019] clopidogrel  75 mg Oral Daily    [START ON 9/9/2019] polyethylene glycol  17 g Oral Daily     Continuous Infusions:  As Needed: acetaminophen, acetaminophen, Dextrose 10% Bolus, glucagon (human recombinant), glucagon (human recombinant), glucose, glucose, insulin aspart U-100, insulin aspart U-100, ondansetron, ramelteon, sodium chloride 0.9%, sodium chloride 0.9%    Significant Imaging: will review

## 2019-09-08 NOTE — ED NOTES
[facility] called stating transport would be delayed approximately 45 min to 1 hour.  Updated primary nurse and Dr. Thompson.

## 2019-09-08 NOTE — H&P
[facility] Hospital Medicine  History & Physical    Patient Name: Shimon Ch Jr.  MRN: [id number]  Admission Date: 9/8/2019  Attending Physician: Joey Naylor DO  Primary Care Provider: Sabino Ruiz MD         Patient information was obtained from patient, spouse/SO and ER records.     Subjective:     Principal Problem:Bradycardia    Chief Complaint:   Chief Complaint   Patient presents with    Bradycardia     Pt states called cardiologist this am and informed heartrate was 35.  States had some tightness across chest, denies SOB.  States pulse last pm was 45.          HPI:  Patient currently presents with concern regarding bradycardia.  Patient notably reports the onset of chest pain last evening but describes that this resolved after a short period of time.  Patient notes that his heart rate was in the 40s at that time.  This morning he notes pressure in the chest intermittently but also notes that the heart rate is now in the 30s.  There is no associated shortness of breath or palpitations.  Patient denies any nausea or diaphoresis.  Patient does notably have a history of prior coronary artery disease status post stenting in the past.  He also has a history of diabetes, hypertension, and hyperlipidemia.  He is a patient of Dr. Angela.    ED discussed with Dr Anthony, recommended [facility] pt to [facility] and holding BB and ccb. And consult [facility] cards    Past Medical History:   Diagnosis Date    Coronary artery disease     Diabetes mellitus     Hyperlipidemia     Hypertension        History reviewed. No pertinent surgical history.    Review of patient's allergies indicates:  No Known Allergies    No current facility-administered medications on file prior to encounter.      Current Outpatient Medications on File Prior to Encounter   Medication Sig    amlodipine-benazepril 5-20 mg (LOTREL) 5-20 mg per capsule TAKE 1 CAPSULE BY MOUTH EVERY DAY    aspirin (ECOTRIN) 81 MG  EC tablet Take 81 mg by mouth once daily.    atorvastatin (LIPITOR) 40 MG tablet Take 40 mg by mouth every evening.    carvedilol (COREG) 25 MG tablet TAKE 1 TABLET BY MOUTH TWICE A DAY WITH MEALS    cilostazol (PLETAL) 50 MG Tab TAKE 1 TABLET (50 MG TOTAL) BY MOUTH 2 (TWO) TIMES DAILY.    clopidogrel (PLAVIX) 75 mg tablet TAKE 1 TABLET (75 MG TOTAL) BY MOUTH ONCE DAILY.    cyanocobalamin (VITAMIN B-12) 1000 MCG tablet Take 100 mcg by mouth once daily.    ferrous sulfate 325 mg (65 mg iron) Tab tablet Take 325 mg by mouth daily with breakfast.    fish oil-omega-3 fatty acids 300-1,000 mg capsule Take 1 capsule by mouth once daily.    metFORMIN (GLUCOPHAGE) 500 MG tablet Take 750 mg by mouth 2 (two) times daily with meals.     multivitamin (THERAGRAN) per tablet Take 1 tablet by mouth once daily.    FLUARIX QUAD 5493-0712, PF, 60 mcg (15 mcg x 4)/0.5 mL vaccine TO BE ADMINISTERED BY PHARMASIST     Family History     None        Tobacco Use    Smoking status: Former Smoker     Last attempt to quit: 2006     Years since quittin.2   Substance and Sexual Activity    Alcohol use: Yes     Comment: mixed drinks daily    Drug use: No    Sexual activity: Not on file     Review of Systems   Constitutional: Negative for activity change, chills, fatigue and fever.   HENT: Negative for congestion and nosebleeds.    Eyes: Negative for photophobia and visual disturbance.   Respiratory: Positive for chest tightness. Negative for shortness of breath.    Cardiovascular: Negative for chest pain, palpitations and leg swelling.   Gastrointestinal: Negative for abdominal distention, blood in stool, nausea and vomiting.   Genitourinary: Negative for difficulty urinating and hematuria.   Musculoskeletal: Negative for arthralgias and myalgias.   Neurological: Negative for dizziness, tremors and numbness.   Psychiatric/Behavioral: Negative for agitation and confusion. The patient is not nervous/anxious.      Objective:      Vital Signs (Most Recent):  Temp: 98.1 °F (36.7 °C) (09/08/19 1606)  Pulse: (!) 37 (09/08/19 1706)  Resp: 16 (09/08/19 1602)  BP: (!) 158/70 (09/08/19 1602)  SpO2: 96 % (09/08/19 1602) Vital Signs (24h Range):  Temp:  [98.1 °F (36.7 °C)-98.6 °F (37 °C)] 98.1 °F (36.7 °C)  Pulse:  [32-42] 37  Resp:  [13-24] 16  SpO2:  [93 %-98 %] 96 %  BP: (127-200)/(60-90) 158/70     Weight: 95.3 kg (210 lb)  Body mass index is 31.01 kg/m².    Physical Exam   Constitutional: He is oriented to person, place, and time. He appears well-developed and well-nourished.   HENT:   Head: Normocephalic and atraumatic.   Eyes: EOM are normal.   Neck: Normal range of motion. Neck supple. No JVD present.   Cardiovascular: Normal rate and regular rhythm.   Pulmonary/Chest: Effort normal.   Abdominal: Soft. Bowel sounds are normal. He exhibits no distension. There is no guarding.   Musculoskeletal: Normal range of motion. He exhibits no edema or deformity.   Neurological: He is alert and oriented to person, place, and time.   Psychiatric: He has a normal mood and affect. His behavior is normal. Judgment and thought content normal.   Nursing note and vitals reviewed.        CRANIAL NERVES     CN III, IV, VI   Extraocular motions are normal.        Significant Labs: .  Recent Labs   Lab 09/08/19  1142   WBC 6.78   HGB 12.3*   HCT 36.7*        Recent Labs   Lab 09/08/19  1142      K 5.3*      CO2 21*   BUN 36*   CREATININE 2.34*   *   CALCIUM 9.1     Recent Labs   Lab 09/08/19  1142   ALKPHOS 73   ALT 25   AST 25   ALBUMIN 4.3   PROT 7.6   BILITOT 0.4      Recent Labs     09/08/19  1142   TROPONINI 0.014     No results for input(s): POCTGLUCOSE in the last 168 hours.  No results found for: HGBA1C  Scheduled Meds:   [START ON 9/10/2019] benazepril  20 mg Oral Daily    And    [START ON 9/10/2019] amLODIPine  5 mg Oral Daily    [START ON 9/9/2019] aspirin  81 mg Oral Daily    atorvastatin  40 mg Oral QHS    [START ON  9/9/2019] cilostazol  50 mg Oral BID    [START ON 9/9/2019] clopidogrel  75 mg Oral Daily    [START ON 9/9/2019] polyethylene glycol  17 g Oral Daily     Continuous Infusions:  As Needed: acetaminophen, acetaminophen, Dextrose 10% Bolus, glucagon (human recombinant), glucagon (human recombinant), glucose, glucose, insulin aspart U-100, insulin aspart U-100, ondansetron, ramelteon, sodium chloride 0.9%, sodium chloride 0.9%    Significant Imaging: will review    Assessment/Plan:     * Bradycardia  Hold BB, CaCB  Consult cards  Trop x 3  tele      Essential hypertension  stable      Type 2 diabetes mellitus without complication, without long-term current use of insulin  ISS for now      Chest pain    tightness  consulted [facility] cards already    WANG (acute kidney injury)  F/u cr level        VTE Risk Mitigation (From admission, onward)        Ordered     IP VTE HIGH RISK PATIENT  Once      09/08/19 1711     Place JUDSON hose  Until discontinued      09/08/19 1711     Place sequential compression device  Until discontinued      09/08/19 1711     Reason for No Pharmacological VTE Prophylaxis  Once      09/08/19 1711             Joey Naylor DO  Department of Hospital Medicine   [facility]

## 2019-09-08 NOTE — ED NOTES
Called [facility] to verify telemetry bed availability.  States pending d/c and will have bed available.

## 2019-09-08 NOTE — ED NOTES
Pt presents to the ED with c/o bradycardia. Pt states that he checked his fitbit yesterday and saw that his heart rate was low in the 40's. Pt states that his heart rate usually runs in the 60's. Pt states that their were no other significant symptoms that caused him to check his HR on his fitbit. Pt states that he did start having some pressure in his chest last night that was intermittent and continued on to this morning. Pt denied any chest pain at this current time. Pt states that he called his cardiologist who informed him to hold on his medication today and come to the ED. Pt states that he only feels a little weak. Pt denies any chest pain, nausea, weakness, diaphoresis, dizziness, shortness of breath, or feelings of near syncope. Pt states that he does have stents placed due to blockages in the heart but has never had an actual heart attack. NAD noted, respirations even and unlabored, skin warm and dry. AAOx3. Cardiac monitoring in process.

## 2019-09-08 NOTE — HPI
Patient currently presents with concern regarding bradycardia.  Patient notably reports the onset of chest pain last evening but describes that this resolved after a short period of time.  Patient notes that his heart rate was in the 40s at that time.  This morning he notes pressure in the chest intermittently but also notes that the heart rate is now in the 30s.  There is no associated shortness of breath or palpitations.  Patient denies any nausea or diaphoresis.  Patient does notably have a history of prior coronary artery disease status post stenting in the past.  He also has a history of diabetes, hypertension, and hyperlipidemia.  He is a patient of Dr. Angela.    ED discussed with Dr Anthony, recommended admitting pt to [facility] and holding BB and ccb. And consult [facility] cards

## 2019-09-08 NOTE — ED PROVIDER NOTES
Encounter Date: 2019       History     Chief Complaint   Patient presents with    Bradycardia     Pt states called cardiologist this am and informed heartrate was 35.  States had some tightness across chest, denies SOB.  States pulse last pm was 45.       Patient currently presents with concern regarding bradycardia.  Patient notably reports the onset of chest pain last evening but describes that this resolved after a short period of time.  Patient notes that his heart rate was in the 40s at that time.  This morning he notes pressure in the chest intermittently but also notes that the heart rate is now in the 30s.  There is no associated shortness of breath or palpitations.  Patient denies any nausea or diaphoresis.  Patient does notably have a history of prior coronary artery disease status post stenting in the past.  He also has a history of diabetes, hypertension, and hyperlipidemia.  He is a patient of Dr. Angela.        Review of patient's allergies indicates:  No Known Allergies  Past Medical History:   Diagnosis Date    Coronary artery disease     Diabetes mellitus     Hyperlipidemia     Hypertension      History reviewed. No pertinent surgical history.  History reviewed. No pertinent family history.  Social History     Tobacco Use    Smoking status: Former Smoker     Last attempt to quit: 2006     Years since quittin.2   Substance Use Topics    Alcohol use: Yes     Comment: mixed drinks daily    Drug use: No     Review of Systems   Constitutional: Negative for chills and fever.   HENT: Negative for congestion.    Respiratory: Negative for shortness of breath.    Cardiovascular: Positive for chest pain. Negative for leg swelling.   Gastrointestinal: Negative for abdominal pain, constipation, diarrhea, nausea and vomiting.   Genitourinary: Negative for dysuria, frequency and urgency.   Skin: Negative for color change and rash.   Allergic/Immunologic: Negative for immunocompromised state.    Neurological: Negative for weakness and numbness.   Hematological: Negative for adenopathy. Does not bruise/bleed easily.   All other systems reviewed and are negative.      Physical Exam     Initial Vitals [09/08/19 1121]   BP Pulse Resp Temp SpO2   (!) 200/88 (!) 38 18 98.6 °F (37 °C) 98 %      MAP       --         Physical Exam    Nursing note and vitals reviewed.  Constitutional: He appears well-developed and well-nourished. He is not diaphoretic. No distress.   HENT:   Head: Normocephalic and atraumatic.   Right Ear: External ear normal.   Left Ear: External ear normal.   Nose: Nose normal.   Mouth/Throat: Oropharynx is clear and moist.   Eyes: Conjunctivae and EOM are normal. Pupils are equal, round, and reactive to light. No scleral icterus.   Neck: Neck supple. No JVD present.   Cardiovascular: Regular rhythm, normal heart sounds and intact distal pulses. Bradycardia present.  Exam reveals no gallop and no friction rub.    No murmur heard.  Pulmonary/Chest: Breath sounds normal. No respiratory distress. He has no wheezes. He has no rhonchi. He has no rales.   Abdominal: Soft. Bowel sounds are normal. He exhibits no distension. There is no tenderness.   Musculoskeletal: Normal range of motion. He exhibits no edema.   Neurological: He is alert and oriented to person, place, and time. He has normal strength. No cranial nerve deficit or sensory deficit. GCS score is 15. GCS eye subscore is 4. GCS verbal subscore is 5. GCS motor subscore is 6.   Skin: Skin is warm and dry. No rash noted.   Psychiatric: He has a normal mood and affect. His behavior is normal.         ED Course   Procedures  Labs Reviewed   CBC W/ AUTO DIFFERENTIAL - Abnormal; Notable for the following components:       Result Value    RBC 3.56 (*)     Hemoglobin 12.3 (*)     Hematocrit 36.7 (*)     Mean Corpuscular Volume 103 (*)     Mean Corpuscular Hemoglobin 34.6 (*)     All other components within normal limits   COMPREHENSIVE METABOLIC  PANEL - Abnormal; Notable for the following components:    Potassium 5.3 (*)     CO2 21 (*)     Glucose 149 (*)     BUN, Bld 36 (*)     Creatinine 2.34 (*)     eGFR if  33.7 (*)     eGFR if non  29.1 (*)     All other components within normal limits   NT-PRO NATRIURETIC PEPTIDE - Abnormal; Notable for the following components:    NT-proBNP 1270 (*)     All other components within normal limits   TROPONIN I     EKG Readings: (Independently Interpreted)   Initial Reading: No STEMI. Ectopy: No Ectopy. Axis: Normal.   EKG from 11/31 demonstrates a sinus bradycardia with a 2nd degree AV block with 2-1 AV conduction.         Imaging Results          X-Ray Chest AP Portable (Final result)  Result time 09/08/19 11:43:42    Final result by Kimberlee Nascimento MD (09/08/19 11:43:42)                 Impression:      No cardiopulmonary process noted.      Electronically signed by: Kimberlee Nascimento  Date:    09/08/2019  Time:    11:43             Narrative:    EXAMINATION:  XR CHEST AP PORTABLE    CLINICAL HISTORY:  Chest Pain;    COMPARISON:  No priors    FINDINGS:  No infiltrate or effusion identified.  Cardiomediastinal silhouette is within normal limits.                                 Medical Decision Making:   ED Management:  All available pertinent findings from the encounter were discussed with the cardiologist  who agrees with our plans for admission for cardiac monitoring and cardiology consultation.  We will continue to hold the beta-blocker from the patient's regimen.  We will also continue the patient's regularly scheduled Plavix and aspirin.  No recommendations for anticoagulation at this time.    All historical, clinical, radiographic, and laboratory findings were reviewed with the patient/family in detail along with the indications for transport to the facility in [place] in order to receive cardiac monitoring and cardiology consultation.  All remaining questions  and concerns were addressed at this time and the patient/family communicates understanding and agrees to proceed accordingly.  Similarly, all pertinent details of the encounter were discussed with Dr. Naylor who agrees to receive the patient at [facility] for further care as outlined above.  The patient will be transferred by [facility] ambulance services secondary to a need for ongoing cardiac monitoring en route.  Frederic Thompson MD  1:23 PM                          Clinical Impression:       ICD-10-CM ICD-9-CM   1. 2nd degree AV block I44.1 426.13   2. Bradycardia R00.1 427.89   3. Chest pain, unspecified type R07.9 786.50   4. WANG (acute kidney injury) N17.9 584.9   5. Congestive heart failure, unspecified HF chronicity, unspecified heart failure type I50.9 428.0                                Frederic Thompson MD  09/08/19 1320

## 2019-09-08 NOTE — TELEPHONE ENCOUNTER
Patient call to report Low heart rate of 35, started on yesterday.  Denies any other symptoms.  Vitals taken while nurse on the telephone as was: /36,  HR 38    Reason for Disposition   Heart beating very slowly (e.g., < 50 / minute)  (Exception: athlete)    Additional Information   Negative: Difficulty breathing   Negative: Chest pain   Negative: [1] Heart beating very rapidly (e.g., > 140 / minute) AND [2] present now  (Exception: during exercise)   Negative: Dizziness, lightheadedness, or weakness    Protocols used: HEART RATE AND HEARTBEAT [id number]

## 2019-09-08 NOTE — NURSING
Patient arrived to telemetry unit from [facility] ED via [facility] ambulance service without incident. No S/S of pain or discomfort noted at time of arrival. Verbal, able to make needs known, answers questions appropriately. Patient oriented to room and placed on cardiac monitoring. Patient and patient's spouse educated on VIP model- verbalizes understanding. Bed in lowest position, side rails up x 2, call light within reach. Will continue to monitor patient.

## 2019-09-08 NOTE — ED NOTES
Spoke with Amaury at [facility]. States that Pamela is in a room right now and will call back when she comes out to get report.

## 2019-09-08 NOTE — HPI
Patient currently presents with concern regarding bradycardia.  Patient notably reports the onset of chest pain last evening but describes that this resolved after a short period of time.  Patient notes that his heart rate was in the 40s at that time.  This morning he notes pressure in the chest intermittently but also notes that the heart rate is now in the 30s.  There is no associated shortness of breath or palpitations.  Patient denies any nausea or diaphoresis.  Patient does notably have a history of prior coronary artery disease status post stenting in the past.  He also has a history of diabetes, hypertension, and hyperlipidemia.  He is a patient of Dr. Angela.       The ED physician discussed the case with Dr Anthony, recommended admitting pt to [facility] and [facility] .holding BB abd cac B, continue other meds

## 2019-09-08 NOTE — HOSPITAL COURSE
Pt evaluated he has some chest tightness but no cp, no N,V. Not in distress  9/9 pt still has low HR, cards on board, pt will have nuc stress test and 2 d echo, might need pacemaker. Cr around 1.5, will gently hydrate the pt  9/10 appreciates cardiology recommendations  no pacemaker for now, stress for ischemia evaluation is normal. Restart home BP meds and hold coreg and outpatient FU with Cards for Holter monitor.

## 2019-09-09 PROBLEM — I44.1 AV BLOCK, MOBITZ 1: Status: ACTIVE | Noted: 2019-09-09

## 2019-09-09 LAB
ALBUMIN SERPL BCP-MCNC: 3.6 G/DL (ref 3.5–5.2)
ALP SERPL-CCNC: 66 U/L (ref 55–135)
ALT SERPL W/O P-5'-P-CCNC: 18 U/L (ref 10–44)
ANION GAP SERPL CALC-SCNC: 8 MMOL/L (ref 8–16)
ANION GAP SERPL CALC-SCNC: 8 MMOL/L (ref 8–16)
AORTIC ROOT ANNULUS: 3.3 CM
AORTIC VALVE CUSP SEPERATION: 1.52 CM
AST SERPL-CCNC: 16 U/L (ref 10–40)
AV INDEX (PROSTH): 0.52
AV MEAN GRADIENT: 19 MMHG
AV PEAK GRADIENT: 38 MMHG
AV VALVE AREA: 1.44 CM2
AV VELOCITY RATIO: 0.46
BASOPHILS # BLD AUTO: 0.01 K/UL (ref 0–0.2)
BASOPHILS # BLD AUTO: 0.01 K/UL (ref 0–0.2)
BASOPHILS NFR BLD: 0.2 % (ref 0–1.9)
BASOPHILS NFR BLD: 0.2 % (ref 0–1.9)
BILIRUB SERPL-MCNC: 0.5 MG/DL (ref 0.1–1)
BSA FOR ECHO PROCEDURE: 2.15 M2
BUN SERPL-MCNC: 35 MG/DL (ref 6–20)
BUN SERPL-MCNC: 35 MG/DL (ref 6–20)
CALCIUM SERPL-MCNC: 8.5 MG/DL (ref 8.7–10.5)
CALCIUM SERPL-MCNC: 8.5 MG/DL (ref 8.7–10.5)
CHLORIDE SERPL-SCNC: 107 MMOL/L (ref 95–110)
CHLORIDE SERPL-SCNC: 107 MMOL/L (ref 95–110)
CO2 SERPL-SCNC: 22 MMOL/L (ref 23–29)
CO2 SERPL-SCNC: 22 MMOL/L (ref 23–29)
CREAT SERPL-MCNC: 1.5 MG/DL (ref 0.5–1.4)
CREAT SERPL-MCNC: 1.5 MG/DL (ref 0.5–1.4)
CV ECHO LV RWT: 0.44 CM
CV STRESS BASE HR: 50 BPM
DIASTOLIC BLOOD PRESSURE: 63 MMHG
DIFFERENTIAL METHOD: ABNORMAL
DIFFERENTIAL METHOD: ABNORMAL
DOP CALC AO PEAK VEL: 3.07 M/S
DOP CALC AO VTI: 61.5 CM
DOP CALC LVOT AREA: 2.8 CM2
DOP CALC LVOT DIAMETER: 1.88 CM
DOP CALC LVOT PEAK VEL: 1.4 M/S
DOP CALC LVOT STROKE VOLUME: 88.45 CM3
DOP CALCLVOT PEAK VEL VTI: 31.88 CM
E WAVE DECELERATION TIME: 160.17 MSEC
E/A RATIO: 1.84
ECHO LV POSTERIOR WALL: 1.06 CM (ref 0.6–1.1)
EOSINOPHIL # BLD AUTO: 0.3 K/UL (ref 0–0.5)
EOSINOPHIL # BLD AUTO: 0.3 K/UL (ref 0–0.5)
EOSINOPHIL NFR BLD: 5.2 % (ref 0–8)
EOSINOPHIL NFR BLD: 5.2 % (ref 0–8)
ERYTHROCYTE [DISTWIDTH] IN BLOOD BY AUTOMATED COUNT: 12.2 % (ref 11.5–14.5)
ERYTHROCYTE [DISTWIDTH] IN BLOOD BY AUTOMATED COUNT: 12.2 % (ref 11.5–14.5)
EST. GFR  (AFRICAN AMERICAN): 58 ML/MIN/1.73 M^2
EST. GFR  (AFRICAN AMERICAN): 58 ML/MIN/1.73 M^2
EST. GFR  (NON AFRICAN AMERICAN): 50 ML/MIN/1.73 M^2
EST. GFR  (NON AFRICAN AMERICAN): 50 ML/MIN/1.73 M^2
FRACTIONAL SHORTENING: 40 % (ref 28–44)
GLUCOSE SERPL-MCNC: 151 MG/DL (ref 70–110)
GLUCOSE SERPL-MCNC: 151 MG/DL (ref 70–110)
HCT VFR BLD AUTO: 33.3 % (ref 40–54)
HCT VFR BLD AUTO: 33.3 % (ref 40–54)
HGB BLD-MCNC: 11 G/DL (ref 14–18)
HGB BLD-MCNC: 11 G/DL (ref 14–18)
INR PPP: 1 (ref 0.8–1.2)
INTERVENTRICULAR SEPTUM: 1.14 CM (ref 0.6–1.1)
IVRT: 0.05 MSEC
LA MAJOR: 5.32 CM
LA MINOR: 5.93 CM
LA WIDTH: 3.55 CM
LEFT ATRIUM SIZE: 4.29 CM
LEFT ATRIUM VOLUME INDEX: 35 ML/M2
LEFT ATRIUM VOLUME: 72.6 CM3
LEFT INTERNAL DIMENSION IN SYSTOLE: 2.85 CM (ref 2.1–4)
LEFT VENTRICLE DIASTOLIC VOLUME INDEX: 51.09 ML/M2
LEFT VENTRICLE DIASTOLIC VOLUME: 105.92 ML
LEFT VENTRICLE MASS INDEX: 93 G/M2
LEFT VENTRICLE SYSTOLIC VOLUME INDEX: 14.9 ML/M2
LEFT VENTRICLE SYSTOLIC VOLUME: 30.81 ML
LEFT VENTRICULAR INTERNAL DIMENSION IN DIASTOLE: 4.77 CM (ref 3.5–6)
LEFT VENTRICULAR MASS: 192.02 G
LYMPHOCYTES # BLD AUTO: 1.4 K/UL (ref 1–4.8)
LYMPHOCYTES # BLD AUTO: 1.4 K/UL (ref 1–4.8)
LYMPHOCYTES NFR BLD: 24.3 % (ref 18–48)
LYMPHOCYTES NFR BLD: 24.3 % (ref 18–48)
MAGNESIUM SERPL-MCNC: 2.3 MG/DL (ref 1.6–2.6)
MCH RBC QN AUTO: 34 PG (ref 27–31)
MCH RBC QN AUTO: 34 PG (ref 27–31)
MCHC RBC AUTO-ENTMCNC: 33 G/DL (ref 32–36)
MCHC RBC AUTO-ENTMCNC: 33 G/DL (ref 32–36)
MCV RBC AUTO: 103 FL (ref 82–98)
MCV RBC AUTO: 103 FL (ref 82–98)
MONOCYTES # BLD AUTO: 0.6 K/UL (ref 0.3–1)
MONOCYTES # BLD AUTO: 0.6 K/UL (ref 0.3–1)
MONOCYTES NFR BLD: 11 % (ref 4–15)
MONOCYTES NFR BLD: 11 % (ref 4–15)
MV PEAK A VEL: 0.93 M/S
MV PEAK E VEL: 1.71 M/S
NEUTROPHILS # BLD AUTO: 3.4 K/UL (ref 1.8–7.7)
NEUTROPHILS # BLD AUTO: 3.4 K/UL (ref 1.8–7.7)
NEUTROPHILS NFR BLD: 59.3 % (ref 38–73)
NEUTROPHILS NFR BLD: 59.3 % (ref 38–73)
OHS CV CPX 85 PERCENT MAX PREDICTED HEART RATE MALE: 136
OHS CV CPX MAX PREDICTED HEART RATE: 160
OHS CV CPX PATIENT IS FEMALE: 0
OHS CV CPX PATIENT IS MALE: 1
OHS CV CPX PEAK DIASTOLIC BLOOD PRESSURE: 61 MMHG
OHS CV CPX PEAK HEAR RATE: 67 BPM
OHS CV CPX PEAK RATE PRESSURE PRODUCT: 9782
OHS CV CPX PEAK SYSTOLIC BLOOD PRESSURE: 146 MMHG
OHS CV CPX PERCENT MAX PREDICTED HEART RATE ACHIEVED: 42
OHS CV CPX RATE PRESSURE PRODUCT PRESENTING: 7250
PHOSPHATE SERPL-MCNC: 4.9 MG/DL (ref 2.7–4.5)
PISA TR MAX VEL: 1.77 M/S
PLATELET # BLD AUTO: 277 K/UL (ref 150–350)
PLATELET # BLD AUTO: 277 K/UL (ref 150–350)
PMV BLD AUTO: 10.2 FL (ref 9.2–12.9)
PMV BLD AUTO: 10.2 FL (ref 9.2–12.9)
POCT GLUCOSE: 132 MG/DL (ref 70–110)
POCT GLUCOSE: 135 MG/DL (ref 70–110)
POCT GLUCOSE: 169 MG/DL (ref 70–110)
POCT GLUCOSE: 169 MG/DL (ref 70–110)
POTASSIUM SERPL-SCNC: 4.4 MMOL/L (ref 3.5–5.1)
POTASSIUM SERPL-SCNC: 4.4 MMOL/L (ref 3.5–5.1)
PROT SERPL-MCNC: 6.5 G/DL (ref 6–8.4)
PROTHROMBIN TIME: 10.5 SEC (ref 9–12.5)
PULM VEIN S/D RATIO: 1.48
PV PEAK D VEL: 0.44 M/S
PV PEAK S VEL: 0.65 M/S
PV PEAK VELOCITY: 1.49 CM/S
RA MAJOR: 5.83 CM
RA PRESSURE: 8 MMHG
RBC # BLD AUTO: 3.24 M/UL (ref 4.6–6.2)
RBC # BLD AUTO: 3.24 M/UL (ref 4.6–6.2)
RIGHT VENTRICULAR END-DIASTOLIC DIMENSION: 2.92 CM
SODIUM SERPL-SCNC: 137 MMOL/L (ref 136–145)
SODIUM SERPL-SCNC: 137 MMOL/L (ref 136–145)
STRESS ECHO TARGET HR: 136 BPM
SYSTOLIC BLOOD PRESSURE: 145 MMHG
TR MAX PG: 13 MMHG
TROPONIN I SERPL DL<=0.01 NG/ML-MCNC: 0.01 NG/ML (ref 0–0.03)
TV REST PULMONARY ARTERY PRESSURE: 21 MMHG
WBC # BLD AUTO: 5.75 K/UL (ref 3.9–12.7)
WBC # BLD AUTO: 5.75 K/UL (ref 3.9–12.7)

## 2019-09-09 PROCEDURE — 25000003 PHARM REV CODE 250: Performed by: EMERGENCY MEDICINE

## 2019-09-09 PROCEDURE — 25000003 PHARM REV CODE 250: Performed by: HOSPITALIST

## 2019-09-09 PROCEDURE — 99220 PR INITIAL OBSERVATION CARE,LEVL III: ICD-10-PCS | Mod: ,,, | Performed by: INTERNAL MEDICINE

## 2019-09-09 PROCEDURE — 93010 ELECTROCARDIOGRAM REPORT: CPT | Mod: ,,, | Performed by: INTERNAL MEDICINE

## 2019-09-09 PROCEDURE — G0378 HOSPITAL OBSERVATION PER HR: HCPCS

## 2019-09-09 PROCEDURE — 85025 COMPLETE CBC W/AUTO DIFF WBC: CPT

## 2019-09-09 PROCEDURE — 93005 ELECTROCARDIOGRAM TRACING: CPT

## 2019-09-09 PROCEDURE — 80053 COMPREHEN METABOLIC PANEL: CPT

## 2019-09-09 PROCEDURE — 93010 EKG 12-LEAD: ICD-10-PCS | Mod: ,,, | Performed by: INTERNAL MEDICINE

## 2019-09-09 PROCEDURE — 99220 PR INITIAL OBSERVATION CARE,LEVL III: CPT | Mod: ,,, | Performed by: INTERNAL MEDICINE

## 2019-09-09 PROCEDURE — 84100 ASSAY OF PHOSPHORUS: CPT

## 2019-09-09 PROCEDURE — 63600175 PHARM REV CODE 636 W HCPCS: Performed by: HOSPITALIST

## 2019-09-09 PROCEDURE — 36415 COLL VENOUS BLD VENIPUNCTURE: CPT

## 2019-09-09 PROCEDURE — 11000001 HC ACUTE MED/SURG PRIVATE ROOM

## 2019-09-09 PROCEDURE — 94761 N-INVAS EAR/PLS OXIMETRY MLT: CPT

## 2019-09-09 PROCEDURE — 83735 ASSAY OF MAGNESIUM: CPT

## 2019-09-09 RX ORDER — DEXTROSE, SODIUM CHLORIDE, SODIUM LACTATE, POTASSIUM CHLORIDE, AND CALCIUM CHLORIDE 5; .6; .31; .03; .02 G/100ML; G/100ML; G/100ML; G/100ML; G/100ML
INJECTION, SOLUTION INTRAVENOUS CONTINUOUS
Status: DISCONTINUED | OUTPATIENT
Start: 2019-09-09 | End: 2019-09-10

## 2019-09-09 RX ORDER — REGADENOSON 0.08 MG/ML
0.4 INJECTION, SOLUTION INTRAVENOUS ONCE
Status: COMPLETED | OUTPATIENT
Start: 2019-09-09 | End: 2019-09-09

## 2019-09-09 RX ADMIN — REGADENOSON 0.4 MG: 0.08 INJECTION, SOLUTION INTRAVENOUS at 02:09

## 2019-09-09 RX ADMIN — CILOSTAZOL 50 MG: 50 TABLET ORAL at 09:09

## 2019-09-09 RX ADMIN — CLOPIDOGREL BISULFATE 75 MG: 75 TABLET ORAL at 09:09

## 2019-09-09 RX ADMIN — ATORVASTATIN CALCIUM 40 MG: 40 TABLET, FILM COATED ORAL at 09:09

## 2019-09-09 RX ADMIN — ASPIRIN 81 MG: 81 TABLET, COATED ORAL at 09:09

## 2019-09-09 RX ADMIN — POLYETHYLENE GLYCOL 3350 17 G: 17 POWDER, FOR SOLUTION ORAL at 09:09

## 2019-09-09 RX ADMIN — DEXTROSE, SODIUM CHLORIDE, SODIUM LACTATE, POTASSIUM CHLORIDE, AND CALCIUM CHLORIDE: 5; .6; .31; .03; .02 INJECTION, SOLUTION INTRAVENOUS at 02:09

## 2019-09-09 NOTE — PLAN OF CARE
Answering service notified of need to page Dr. Angela to inform him of patient's heart rate dropping to 30 unsustained. Call back number [phone] given to .

## 2019-09-09 NOTE — ASSESSMENT & PLAN NOTE
Hold BB, CaCB  Consult cards  Trop x 3  tele    Cards on board, 2 d echo, nuc stress test, +/- pacemaker

## 2019-09-09 NOTE — SUBJECTIVE & OBJECTIVE
Past Medical History:   Diagnosis Date    Coronary artery disease     Diabetes mellitus     Hyperlipidemia     Hypertension        History reviewed. No pertinent surgical history.    Review of patient's allergies indicates:  No Known Allergies    No current facility-administered medications on file prior to encounter.      Current Outpatient Medications on File Prior to Encounter   Medication Sig    amlodipine-benazepril 5-20 mg (LOTREL) 5-20 mg per capsule TAKE 1 CAPSULE BY MOUTH EVERY DAY    aspirin (ECOTRIN) 81 MG EC tablet Take 81 mg by mouth once daily.    atorvastatin (LIPITOR) 40 MG tablet Take 40 mg by mouth every evening.    carvedilol (COREG) 25 MG tablet TAKE 1 TABLET BY MOUTH TWICE A DAY WITH MEALS    cilostazol (PLETAL) 50 MG Tab TAKE 1 TABLET (50 MG TOTAL) BY MOUTH 2 (TWO) TIMES DAILY.    clopidogrel (PLAVIX) 75 mg tablet TAKE 1 TABLET (75 MG TOTAL) BY MOUTH ONCE DAILY.    cyanocobalamin (VITAMIN B-12) 1000 MCG tablet Take 100 mcg by mouth once daily.    ferrous sulfate 325 mg (65 mg iron) Tab tablet Take 325 mg by mouth daily with breakfast.    fish oil-omega-3 fatty acids 300-1,000 mg capsule Take 1 capsule by mouth once daily.    metFORMIN (GLUCOPHAGE) 500 MG tablet Take 750 mg by mouth 2 (two) times daily with meals.     multivitamin (THERAGRAN) per tablet Take 1 tablet by mouth once daily.     Family History     None        Tobacco Use    Smoking status: Former Smoker     Last attempt to quit: 2006     Years since quittin.2   Substance and Sexual Activity    Alcohol use: Yes     Comment: mixed drinks daily    Drug use: No    Sexual activity: Not Currently     Review of Systems   Constitutional: Negative for activity change, chills, fatigue and fever.   HENT: Negative for congestion and nosebleeds.    Eyes: Negative for photophobia and visual disturbance.   Respiratory: Negative for shortness of breath.    Cardiovascular: Negative for chest pain, palpitations and leg  swelling.   Gastrointestinal: Negative for abdominal distention, blood in stool, nausea and vomiting.   Genitourinary: Negative for difficulty urinating and hematuria.   Musculoskeletal: Negative for arthralgias and myalgias.   Neurological: Negative for dizziness, tremors and numbness.   Psychiatric/Behavioral: Negative for agitation and confusion. The patient is not nervous/anxious.      Objective:     Vital Signs (Most Recent):  Temp: 98.5 °F (36.9 °C) (09/09/19 1323)  Pulse: 65 (09/09/19 1323)  Resp: 18 (09/09/19 1323)  BP: (!) 154/73 (09/09/19 1323)  SpO2: 97 % (09/09/19 1146) Vital Signs (24h Range):  Temp:  [97.2 °F (36.2 °C)-98.5 °F (36.9 °C)] 98.5 °F (36.9 °C)  Pulse:  [32-65] 65  Resp:  [15-22] 18  SpO2:  [94 %-97 %] 97 %  BP: (128-186)/(60-92) 154/73     Weight: 91.5 kg (201 lb 11.5 oz)  Body mass index is 29.79 kg/m².    Physical Exam   Constitutional: He is oriented to person, place, and time. He appears well-developed and well-nourished.   HENT:   Head: Normocephalic and atraumatic.   Eyes: EOM are normal.   Neck: Normal range of motion. Neck supple. No JVD present.   Cardiovascular: Normal rate and regular rhythm.   Pulmonary/Chest: Effort normal.   Abdominal: Soft. Bowel sounds are normal. He exhibits no distension. There is no guarding.   Musculoskeletal: Normal range of motion. He exhibits no edema or deformity.   Neurological: He is alert and oriented to person, place, and time.   Psychiatric: He has a normal mood and affect. His behavior is normal. Judgment and thought content normal.   Nursing note and vitals reviewed.        CRANIAL NERVES     CN III, IV, VI   Extraocular motions are normal.        Significant Labs: .  Recent Labs   Lab 09/08/19  1142 09/09/19  0508   WBC 6.78 5.75  5.75   HGB 12.3* 11.0*  11.0*   HCT 36.7* 33.3*  33.3*    277  277     Recent Labs   Lab 09/08/19  1142 09/09/19  0508    137  137   K 5.3* 4.4  4.4    107  107   CO2 21* 22*  22*   BUN  36* 35*  35*   CREATININE 2.34* 1.5*  1.5*   * 151*  151*   CALCIUM 9.1 8.5*  8.5*   MG  --  2.3   PHOS  --  4.9*     Recent Labs   Lab 09/08/19  1142 09/08/19  2349 09/09/19  0508   ALKPHOS 73  --  66   ALT 25  --  18   AST 25  --  16   ALBUMIN 4.3  --  3.6   PROT 7.6  --  6.5   BILITOT 0.4  --  0.5   INR  --  1.0  --       Recent Labs     09/08/19  1142 09/08/19  1743 09/08/19  2349   TROPONINI 0.014 0.018 0.013  0.013  0.013     Recent Labs   Lab 09/09/19  0547 09/09/19  1320   POCTGLUCOSE 135* 169*     Hemoglobin A1C   Date Value Ref Range Status   09/08/2019 7.2 (H) 4.0 - 5.6 % Final     Comment:     ADA Screening Guidelines:  5.7-6.4%  Consistent with prediabetes  >or=6.5%  Consistent with diabetes  High levels of fetal hemoglobin interfere with the HbA1C  assay. Heterozygous hemoglobin variants (HbS, HgC, etc)do  not significantly interfere with this assay.   However, presence of multiple variants may affect accuracy.       Scheduled Meds:   [START ON 9/10/2019] benazepril  20 mg Oral Daily    And    [START ON 9/10/2019] amLODIPine  5 mg Oral Daily    aspirin  81 mg Oral Daily    atorvastatin  40 mg Oral QHS    cilostazol  50 mg Oral BID    clopidogrel  75 mg Oral Daily    polyethylene glycol  17 g Oral Daily     Continuous Infusions:   dextrose 5% lactated ringers       As Needed: acetaminophen, acetaminophen, Dextrose 10% Bolus, glucagon (human recombinant), glucagon (human recombinant), glucose, glucose, insulin aspart U-100, ondansetron, ramelteon, sodium chloride 0.9%    Significant Imaging: will review

## 2019-09-09 NOTE — HOSPITAL COURSE
9/8/2019 Presented with complaints of chest tightness and low HR. HR noted to be in 30s with EKG with 2nd degree AVB type I. BP stable. Initial troponin .014. BMP with creatinine 2.3. Admitted to [facility] for further evaluation   9/9/2019 No recurrent chest pain/tightness overnight. Creatinine down to 1.5 from 2.3 yesterday. HR remains 40s with 2nd degree AVB type I. CBC with stable H&H. BNP 1270. Plan for nuclear stress test vs MUNDO and echocardiogram. Will continue to hold BB  9/10/2019  Creatinine trended down to 1.1 from 2.3 upon admission. Stress test with no evidence of ischemia and echo with normal LVEF with mild to moderate AS (NESSA 1.44cm2 MG 18mmHg). HR overnight per Epic 50s. Reviewed telemetry with SB overnight with HR 70s NSR this AM with 1st degree AVB confirmed by EKG. Plan to ambulate this AM and discharge today with complete discontinuation of Coreg BID and will arrange for Holter monitor as an outpatient

## 2019-09-09 NOTE — HPI
57yo male with history of CAD s/p PCI 2008 [place] (unknown vessel), PAD s/p bilateral SFA and left popliteal intervention, HTN, DMII, HLP, bilateral carotid bruits and cardiac murmur who presented to the ER with complaints of chest tightness and bradycardia. Mr. Cruz noted his HR to be low in the 30s via his Fit Bit. On Saturday evening, he complained of chest pain while resting in bed. The pain was described as a tightness that was associated with radiation to his arm and jaw/teeth but was not associated with SOB, nausea, vomiting or diaphoresis. The pain lasted a few minutes and resolved spontaneously. He noted his HR to be low in the 30s prompting him to present to the ER. He reports feeling fine earlier in the day and was able to cut the grass with no chest pain or SOB. He reports his chest tightness reminds him of his symptoms prior to his PCI in 2008. He is active and typically walks 10,000 steps per day but reports recently decreasing to 7500 per day due to pain in his right leg. He complains of pain to his right leg with walking described as a cramping sensation that relieves with rest and will recur with resumed activity.

## 2019-09-09 NOTE — CONSULTS
[facility]  Cardiology  Consult Note    Patient Name: Shimon Ch Jr.  MRN: [id number]  Admission Date: 9/8/2019  Hospital Length of Stay: 1 days  Code Status: Full Code   Attending Provider: Joey Naylor DO   Consulting Provider: ESTELA Ramon ANP  Primary Care Physician: Sabino Ruiz MD  Principal Problem:Bradycardia    Patient information was obtained from patient and past medical records.     Inpatient consult to Cardiology-[facility]  Consult performed by: ESTELA Carl, EDA  Consult ordered by: Joey Naylor DO    Inpatient consult to Cardiology  Consult performed by: ESTELA Carl, EDA  Consult ordered by: Frederic Thompson MD        Subjective:     Chief Complaint:  Chest pain      HPI:   59yo male with history of CAD s/p PCI 2008 [place] (unknown vessel), PAD s/p bilateral SFA and left popliteal intervention, HTN, DMII, HLP, bilateral carotid bruits and cardiac murmur who presented to the ER with complaints of chest tightness and bradycardia. Mr. Cruz noted his HR to be low in the 30s via his Fit Bit. On Saturday evening, he complained of chest pain while resting in bed. The pain was described as a tightness that was associated with radiation to his arm and jaw/teeth but was not associated with SOB, nausea, vomiting or diaphoresis. The pain lasted a few minutes and resolved spontaneously. He noted his HR to be low in the 30s prompting him to present to the ER. He reports feeling fine earlier in the day and was able to cut the grass with no chest pain or SOB. He reports his chest tightness reminds him of his symptoms prior to his PCI in 2008. He is active and typically walks 10,000 steps per day but reports recently decreasing to 7500 per day due to pain in his right leg. He complains of pain to his right leg with walking described as a cramping sensation that relieves with rest and will recur with resumed activity.     Hospital Course:    9/8/2019  Presented with complaints of chest tightness and low HR. HR noted to be in 30s with EKG with 2nd degree AVB type I. BP stable. Initial troponin .014. BMP with creatinine 2.3. Admitted to [facility] for further evaluation   9/9/2019 No recurrent chest pain/tightness overnight. Creatinine down to 1.5 from 2.3 yesterday. HR remains 40s with 2nd degree AVB type I. CBC with stable H&H. BNP 1270. Plan for nuclear stress test vs MUNDO and echocardiogram. Will continue to hold BB      Past Medical History:   Diagnosis Date    Coronary artery disease     Diabetes mellitus     Hyperlipidemia     Hypertension        History reviewed. No pertinent surgical history.    Review of patient's allergies indicates:  No Known Allergies    No current facility-administered medications on file prior to encounter.      Current Outpatient Medications on File Prior to Encounter   Medication Sig    amlodipine-benazepril 5-20 mg (LOTREL) 5-20 mg per capsule TAKE 1 CAPSULE BY MOUTH EVERY DAY    aspirin (ECOTRIN) 81 MG EC tablet Take 81 mg by mouth once daily.    atorvastatin (LIPITOR) 40 MG tablet Take 40 mg by mouth every evening.    carvedilol (COREG) 25 MG tablet TAKE 1 TABLET BY MOUTH TWICE A DAY WITH MEALS    cilostazol (PLETAL) 50 MG Tab TAKE 1 TABLET (50 MG TOTAL) BY MOUTH 2 (TWO) TIMES DAILY.    clopidogrel (PLAVIX) 75 mg tablet TAKE 1 TABLET (75 MG TOTAL) BY MOUTH ONCE DAILY.    cyanocobalamin (VITAMIN B-12) 1000 MCG tablet Take 100 mcg by mouth once daily.    ferrous sulfate 325 mg (65 mg iron) Tab tablet Take 325 mg by mouth daily with breakfast.    fish oil-omega-3 fatty acids 300-1,000 mg capsule Take 1 capsule by mouth once daily.    metFORMIN (GLUCOPHAGE) 500 MG tablet Take 750 mg by mouth 2 (two) times daily with meals.     multivitamin (THERAGRAN) per tablet Take 1 tablet by mouth once daily.     Family History     None        Tobacco Use    Smoking status: Former Smoker     Last attempt to quit:  2006     Years since quittin.2   Substance and Sexual Activity    Alcohol use: Yes     Comment: mixed drinks daily    Drug use: No    Sexual activity: Not Currently     Review of Systems   Constitution: Negative for chills, decreased appetite, diaphoresis and fever.   Cardiovascular: Positive for chest pain. Negative for claudication, cyanosis, dyspnea on exertion, irregular heartbeat, leg swelling, near-syncope, orthopnea, palpitations, paroxysmal nocturnal dyspnea and syncope.   Respiratory: Negative for cough, hemoptysis, shortness of breath and wheezing.    Gastrointestinal: Negative for bloating, abdominal pain, constipation, diarrhea, melena, nausea and vomiting.   Neurological: Negative for dizziness and weakness.     Objective:     Vital Signs (Most Recent):  Temp: 97.6 °F (36.4 °C) (19)  Pulse: 60 (19)  Resp: 19 (19)  BP: (!) 159/72 (19)  SpO2: 96 % (19) Vital Signs (24h Range):  Temp:  [97.2 °F (36.2 °C)-98.6 °F (37 °C)] 97.6 °F (36.4 °C)  Pulse:  [32-60] 60  Resp:  [13-24] 19  SpO2:  [93 %-98 %] 96 %  BP: (127-200)/(60-92) 159/72     Weight: 91.5 kg (201 lb 11.5 oz)  Body mass index is 29.79 kg/m².    SpO2: 96 %  O2 Device (Oxygen Therapy): room air      Intake/Output Summary (Last 24 hours) at 2019 0959  Last data filed at 2019 0940  Gross per 24 hour   Intake --   Output 800 ml   Net -800 ml       Lines/Drains/Airways     Peripheral Intravenous Line                 Peripheral IV - Single Lumen 19 1144 18 G Left Antecubital less than 1 day                Physical Exam   Constitutional: He is oriented to person, place, and time. He appears well-developed and well-nourished. No distress.   Cardiovascular: A regularly irregular rhythm present. Bradycardia present. Exam reveals no gallop.   Murmur heard.  Pulmonary/Chest: Effort normal and breath sounds normal. No respiratory distress. He has no wheezes.   Abdominal: Soft.  Bowel sounds are normal. He exhibits no distension. There is no tenderness.   Neurological: He is alert and oriented to person, place, and time.   Skin: Skin is warm and dry.       Significant Labs:     Recent Labs   Lab 09/09/19  0508     137   K 4.4  4.4     107   CO2 22*  22*   BUN 35*  35*   CREATININE 1.5*  1.5*   MG 2.3     Recent Labs   Lab 09/08/19  2349   TROPONINI 0.013  0.013  0.013     Recent Labs   Lab 09/09/19  0508   WBC 5.75  5.75   RBC 3.24*  3.24*   HGB 11.0*  11.0*   HCT 33.3*  33.3*     277   *  103*   MCH 34.0*  34.0*   MCHC 33.0  33.0       Significant Imaging: Echocardiogram:   2D echo with color flow doppler:   Results for orders placed or performed during the hospital encounter of 01/02/18   2D Echo w/ Color Flow Doppler   Result Value Ref Range    QEF 60 55 - 65    Diastolic Dysfunction No     Est. PA Systolic Pressure 13.63     Mitral Valve Mobility NORMAL     Tricuspid Valve Regurgitation TRIVIAL     Narrative    Date of Procedure: 01/02/2018        TEST DESCRIPTION   Technical Quality: This is a technically adequate study.     Aorta: The aortic root is normal in size, measuring 2.8 cm at sinotubular junction.     Left Atrium: The left atrial volume index is normal, measuring 23.89 cc/m2.     Left Ventricle: The left ventricle is normal in size, with an end-diastolic diameter of 5.0 cm, and an end-systolic diameter of 3.9 cm. LV wall thickness is normal, with the septum and the posterior wall each measuring 0.9 cm across. Relative wall   thickness was normal at 0.36, and the LV mass index was 88.6 g/m2 consistent with normal left ventricular mass. There are no regional wall motion abnormalities. Left ventricular systolic function appears normal. Visually estimated ejection fraction is   60-65%. The LV Doppler derived stroke volume equals 169.0 ccs.     Diastolic indices: E wave velocity 0.8 m/s, E/A ratio 0.8,  msec., E/e' ratio(lat) 7.  Diastolic function is normal.     Right Atrium: The right atrium is normal in size, measuring 5.1 cm in length in the apical view.     Right Ventricle: The right ventricle is normal in size measuring 4.1 cm at the base in the apical right ventricle-focused view. Global right ventricular systolic function appears normal. Tricuspid annular plane systolic excursion (TAPSE) is 2.5 cm. The   estimated PA systolic pressure is 14 mmHg.     Aortic Valve:  The aortic valve is mildly sclerotic with normal leaflet mobility. The aortic valve is tri-leaflet in structure. The peak velocity obtained across the aortic valve is 2.12 m/s, which translates to a peak gradient of 18 mmHg. The mean   gradient is 10 mmHg. Using a left ventricular outflow tract diameter of 2.7 cm, a left ventricular outflow tract velocity time integral of 30 cm, and a peak instantaneous transvalvular velocity time integral of 47 cm, the calculated aortic valve area is   3.57 cm2(AVAi is 1.72 cm2/m2).     Mitral Valve:  The mitral valve is mildly sclerotic with normal leaflet mobility.     Tricuspid Valve:  The tricuspid valve is normal in structure with normal leaflet mobility. There is trivial tricuspid regurgitation.     Pulmonary Valve:  The pulmonic valve is not well seen.     IVC: IVC is normal in size and collapses > 50% with a sniff, suggesting normal right atrial pressure of 3 mmHg.     Atrial Septum: The atrial septum is intact.     Intracavitary: There is no evidence of pericardial effusion, intracavity mass, thrombi, or vegetation.         CONCLUSIONS     1 - Normal left ventricular systolic function (EF 60-65%).     2 - Normal left ventricular diastolic function.     3 - Normal right ventricular systolic function .     4 - The estimated PA systolic pressure is 14 mmHg.             This document has been electronically    SIGNED BY: Joey Retana MD On: 01/03/2018 08:44     Assessment and Plan:     * Bradycardia  -as detailed previously     AV  block, Mobitz 1  -2nd degree AVB type I upon admission  -HR 36 upon admission; overnight continued AVB with HR remaining in the 40s-asymptomatic and BP stable  -will continue to hold BB (Coreg); half life 7-10 hours with typical 4-5 half lives needed to clear medication; last dose Saturday evening  -will continue to monitor on telemetry and determine if permanent pacemaker needed after complete BB washout and after ischemic evaluation with stress test    WANG (acute kidney injury)  -creatinine 2.3 upon admission  -down to 1.5 this AM with no intervention  -etiology multifactoral and possibly related to pre renal etiology with low fluid volume vs low CO in setting of bradycardia  -would not be opposed to gentle IVFs if okay with primary team     Chest pain  -some typical and some atypical features  -troponin .014-.018-.013; EKG with 2nd degree AVB but no acute STTW abnormalities  -history of CAD along with HTN, HLP and DMII; concerned about ischemic etiology and needs further evaluation; will plan to start with nuclear stress test over angiogram given negative troponin and elevated creatinine  -continue ASA, statin, Plavix and ACEI; no BB due to bradycardia/AVB; if creatinine does not trend back to baseline may need to hold ACEI  -will do echocardiogram as well     Essential hypertension  -SBP 130s-170s overnight  -on Norvasc and Benazepril at home doses; on Coreg BID at home but hold due to bradycardia/AVB  -no plans for adjustment of medication regimen for now to avoid hypotension in setting of WANG        VTE Risk Mitigation (From admission, onward)        Ordered     IP VTE HIGH RISK PATIENT  Once      09/08/19 1711     Place JUDSON hose  Until discontinued      09/08/19 1711     Place sequential compression device  Until discontinued      09/08/19 1711     Reason for No Pharmacological VTE Prophylaxis  Once      09/08/19 1711          Thank you for your consult. I will follow-up with patient. Please contact us if you  have any additional questions.    Steff Colon APRN, ANP  Cardiology   [facility]

## 2019-09-09 NOTE — ASSESSMENT & PLAN NOTE
-some typical and some atypical features  -troponin .014-.018-.013; EKG with 2nd degree AVB but no acute STTW abnormalities  -history of CAD along with HTN, HLP and DMII; concerned about ischemic etiology and needs further evaluation; will plan to start with nuclear stress test over angiogram given negative troponin and elevated creatinine  -continue ASA, statin, Plavix and ACEI; no BB due to bradycardia/AVB; if creatinine does not trend back to baseline may need to hold ACEI  -will do echocardiogram as well

## 2019-09-09 NOTE — SUBJECTIVE & OBJECTIVE
Past Medical History:   Diagnosis Date    Coronary artery disease     Diabetes mellitus     Hyperlipidemia     Hypertension        History reviewed. No pertinent surgical history.    Review of patient's allergies indicates:  No Known Allergies    No current facility-administered medications on file prior to encounter.      Current Outpatient Medications on File Prior to Encounter   Medication Sig    amlodipine-benazepril 5-20 mg (LOTREL) 5-20 mg per capsule TAKE 1 CAPSULE BY MOUTH EVERY DAY    aspirin (ECOTRIN) 81 MG EC tablet Take 81 mg by mouth once daily.    atorvastatin (LIPITOR) 40 MG tablet Take 40 mg by mouth every evening.    carvedilol (COREG) 25 MG tablet TAKE 1 TABLET BY MOUTH TWICE A DAY WITH MEALS    cilostazol (PLETAL) 50 MG Tab TAKE 1 TABLET (50 MG TOTAL) BY MOUTH 2 (TWO) TIMES DAILY.    clopidogrel (PLAVIX) 75 mg tablet TAKE 1 TABLET (75 MG TOTAL) BY MOUTH ONCE DAILY.    cyanocobalamin (VITAMIN B-12) 1000 MCG tablet Take 100 mcg by mouth once daily.    ferrous sulfate 325 mg (65 mg iron) Tab tablet Take 325 mg by mouth daily with breakfast.    fish oil-omega-3 fatty acids 300-1,000 mg capsule Take 1 capsule by mouth once daily.    metFORMIN (GLUCOPHAGE) 500 MG tablet Take 750 mg by mouth 2 (two) times daily with meals.     multivitamin (THERAGRAN) per tablet Take 1 tablet by mouth once daily.     Family History     None        Tobacco Use    Smoking status: Former Smoker     Last attempt to quit: 2006     Years since quittin.2   Substance and Sexual Activity    Alcohol use: Yes     Comment: mixed drinks daily    Drug use: No    Sexual activity: Not Currently     Review of Systems   Constitution: Negative for chills, decreased appetite, diaphoresis and fever.   Cardiovascular: Positive for chest pain. Negative for claudication, cyanosis, dyspnea on exertion, irregular heartbeat, leg swelling, near-syncope, orthopnea, palpitations, paroxysmal nocturnal dyspnea and syncope.    Respiratory: Negative for cough, hemoptysis, shortness of breath and wheezing.    Gastrointestinal: Negative for bloating, abdominal pain, constipation, diarrhea, melena, nausea and vomiting.   Neurological: Negative for dizziness and weakness.     Objective:     Vital Signs (Most Recent):  Temp: 97.6 °F (36.4 °C) (09/09/19 0851)  Pulse: 60 (09/09/19 0853)  Resp: 19 (09/09/19 0851)  BP: (!) 159/72 (09/09/19 0853)  SpO2: 96 % (09/09/19 0853) Vital Signs (24h Range):  Temp:  [97.2 °F (36.2 °C)-98.6 °F (37 °C)] 97.6 °F (36.4 °C)  Pulse:  [32-60] 60  Resp:  [13-24] 19  SpO2:  [93 %-98 %] 96 %  BP: (127-200)/(60-92) 159/72     Weight: 91.5 kg (201 lb 11.5 oz)  Body mass index is 29.79 kg/m².    SpO2: 96 %  O2 Device (Oxygen Therapy): room air      Intake/Output Summary (Last 24 hours) at 9/9/2019 0959  Last data filed at 9/9/2019 0940  Gross per 24 hour   Intake --   Output 800 ml   Net -800 ml       Lines/Drains/Airways     Peripheral Intravenous Line                 Peripheral IV - Single Lumen 09/08/19 1144 18 G Left Antecubital less than 1 day                Physical Exam   Constitutional: He is oriented to person, place, and time. He appears well-developed and well-nourished. No distress.   Cardiovascular: A regularly irregular rhythm present. Bradycardia present. Exam reveals no gallop.   Murmur heard.  Pulmonary/Chest: Effort normal and breath sounds normal. No respiratory distress. He has no wheezes.   Abdominal: Soft. Bowel sounds are normal. He exhibits no distension. There is no tenderness.   Neurological: He is alert and oriented to person, place, and time.   Skin: Skin is warm and dry.       Significant Labs:     Recent Labs   Lab 09/09/19  0508     137   K 4.4  4.4     107   CO2 22*  22*   BUN 35*  35*   CREATININE 1.5*  1.5*   MG 2.3     Recent Labs   Lab 09/08/19  2349   TROPONINI 0.013  0.013  0.013     Recent Labs   Lab 09/09/19  0508   WBC 5.75  5.75   RBC 3.24*  3.24*   HGB  11.0*  11.0*   HCT 33.3*  33.3*     277   *  103*   MCH 34.0*  34.0*   MCHC 33.0  33.0       Significant Imaging: Echocardiogram:   2D echo with color flow doppler:   Results for orders placed or performed during the hospital encounter of 01/02/18   2D Echo w/ Color Flow Doppler   Result Value Ref Range    QEF 60 55 - 65    Diastolic Dysfunction No     Est. PA Systolic Pressure 13.63     Mitral Valve Mobility NORMAL     Tricuspid Valve Regurgitation TRIVIAL     Narrative    Date of Procedure: 01/02/2018        TEST DESCRIPTION   Technical Quality: This is a technically adequate study.     Aorta: The aortic root is normal in size, measuring 2.8 cm at sinotubular junction.     Left Atrium: The left atrial volume index is normal, measuring 23.89 cc/m2.     Left Ventricle: The left ventricle is normal in size, with an end-diastolic diameter of 5.0 cm, and an end-systolic diameter of 3.9 cm. LV wall thickness is normal, with the septum and the posterior wall each measuring 0.9 cm across. Relative wall   thickness was normal at 0.36, and the LV mass index was 88.6 g/m2 consistent with normal left ventricular mass. There are no regional wall motion abnormalities. Left ventricular systolic function appears normal. Visually estimated ejection fraction is   60-65%. The LV Doppler derived stroke volume equals 169.0 ccs.     Diastolic indices: E wave velocity 0.8 m/s, E/A ratio 0.8,  msec., E/e' ratio(lat) 7. Diastolic function is normal.     Right Atrium: The right atrium is normal in size, measuring 5.1 cm in length in the apical view.     Right Ventricle: The right ventricle is normal in size measuring 4.1 cm at the base in the apical right ventricle-focused view. Global right ventricular systolic function appears normal. Tricuspid annular plane systolic excursion (TAPSE) is 2.5 cm. The   estimated PA systolic pressure is 14 mmHg.     Aortic Valve:  The aortic valve is mildly sclerotic with normal  leaflet mobility. The aortic valve is tri-leaflet in structure. The peak velocity obtained across the aortic valve is 2.12 m/s, which translates to a peak gradient of 18 mmHg. The mean   gradient is 10 mmHg. Using a left ventricular outflow tract diameter of 2.7 cm, a left ventricular outflow tract velocity time integral of 30 cm, and a peak instantaneous transvalvular velocity time integral of 47 cm, the calculated aortic valve area is   3.57 cm2(AVAi is 1.72 cm2/m2).     Mitral Valve:  The mitral valve is mildly sclerotic with normal leaflet mobility.     Tricuspid Valve:  The tricuspid valve is normal in structure with normal leaflet mobility. There is trivial tricuspid regurgitation.     Pulmonary Valve:  The pulmonic valve is not well seen.     IVC: IVC is normal in size and collapses > 50% with a sniff, suggesting normal right atrial pressure of 3 mmHg.     Atrial Septum: The atrial septum is intact.     Intracavitary: There is no evidence of pericardial effusion, intracavity mass, thrombi, or vegetation.         CONCLUSIONS     1 - Normal left ventricular systolic function (EF 60-65%).     2 - Normal left ventricular diastolic function.     3 - Normal right ventricular systolic function .     4 - The estimated PA systolic pressure is 14 mmHg.             This document has been electronically    SIGNED BY: Joey Retana MD On: 01/03/2018 08:44

## 2019-09-09 NOTE — PROGRESS NOTES
[facility] Hospital Medicine  Progress Note    Patient Name: Shimon Ch Jr.  MRN: [id number]  Patient Class: IP- Inpatient   Admission Date: 9/8/2019  Length of Stay: 1 days  Attending Physician: Joey Naylor DO  Primary Care Provider: Sabino Ruiz MD        Subjective:     Principal Problem:Bradycardia        HPI:   Patient currently presents with concern regarding bradycardia.  Patient notably reports the onset of chest pain last evening but describes that this resolved after a short period of time.  Patient notes that his heart rate was in the 40s at that time.  This morning he notes pressure in the chest intermittently but also notes that the heart rate is now in the 30s.  There is no associated shortness of breath or palpitations.  Patient denies any nausea or diaphoresis.  Patient does notably have a history of prior coronary artery disease status post stenting in the past.  He also has a history of diabetes, hypertension, and hyperlipidemia.  He is a patient of Dr. Angela.    ED discussed with Dr Anthony, recommended admitting pt to [facility] and holding BB and ccb. And consult [facility] cards    Overview/Hospital Course:  Pt evaluated he has some chest tightness but no cp, no N,V. Not in distress  9/9 pt still has low HR, cards on board, pt will have nuc stress test and 2 d echo, might need pacemaker. Cr around 1.5, will gently hydrate the pt    Past Medical History:   Diagnosis Date    Coronary artery disease     Diabetes mellitus     Hyperlipidemia     Hypertension        History reviewed. No pertinent surgical history.    Review of patient's allergies indicates:  No Known Allergies    No current facility-administered medications on file prior to encounter.      Current Outpatient Medications on File Prior to Encounter   Medication Sig    amlodipine-benazepril 5-20 mg (LOTREL) 5-20 mg per capsule TAKE 1 CAPSULE BY MOUTH EVERY DAY    aspirin (ECOTRIN) 81 MG EC  tablet Take 81 mg by mouth once daily.    atorvastatin (LIPITOR) 40 MG tablet Take 40 mg by mouth every evening.    carvedilol (COREG) 25 MG tablet TAKE 1 TABLET BY MOUTH TWICE A DAY WITH MEALS    cilostazol (PLETAL) 50 MG Tab TAKE 1 TABLET (50 MG TOTAL) BY MOUTH 2 (TWO) TIMES DAILY.    clopidogrel (PLAVIX) 75 mg tablet TAKE 1 TABLET (75 MG TOTAL) BY MOUTH ONCE DAILY.    cyanocobalamin (VITAMIN B-12) 1000 MCG tablet Take 100 mcg by mouth once daily.    ferrous sulfate 325 mg (65 mg iron) Tab tablet Take 325 mg by mouth daily with breakfast.    fish oil-omega-3 fatty acids 300-1,000 mg capsule Take 1 capsule by mouth once daily.    metFORMIN (GLUCOPHAGE) 500 MG tablet Take 750 mg by mouth 2 (two) times daily with meals.     multivitamin (THERAGRAN) per tablet Take 1 tablet by mouth once daily.     Family History     None        Tobacco Use    Smoking status: Former Smoker     Last attempt to quit: 2006     Years since quittin.2   Substance and Sexual Activity    Alcohol use: Yes     Comment: mixed drinks daily    Drug use: No    Sexual activity: Not Currently     Review of Systems   Constitutional: Negative for activity change, chills, fatigue and fever.   HENT: Negative for congestion and nosebleeds.    Eyes: Negative for photophobia and visual disturbance.   Respiratory: Negative for shortness of breath.    Cardiovascular: Negative for chest pain, palpitations and leg swelling.   Gastrointestinal: Negative for abdominal distention, blood in stool, nausea and vomiting.   Genitourinary: Negative for difficulty urinating and hematuria.   Musculoskeletal: Negative for arthralgias and myalgias.   Neurological: Negative for dizziness, tremors and numbness.   Psychiatric/Behavioral: Negative for agitation and confusion. The patient is not nervous/anxious.      Objective:     Vital Signs (Most Recent):  Temp: 98.5 °F (36.9 °C) (19 1323)  Pulse: 65 (19 1323)  Resp: 18 (19 1323)  BP:  (!) 154/73 (09/09/19 1323)  SpO2: 97 % (09/09/19 1146) Vital Signs (24h Range):  Temp:  [97.2 °F (36.2 °C)-98.5 °F (36.9 °C)] 98.5 °F (36.9 °C)  Pulse:  [32-65] 65  Resp:  [15-22] 18  SpO2:  [94 %-97 %] 97 %  BP: (128-186)/(60-92) 154/73     Weight: 91.5 kg (201 lb 11.5 oz)  Body mass index is 29.79 kg/m².    Physical Exam   Constitutional: He is oriented to person, place, and time. He appears well-developed and well-nourished.   HENT:   Head: Normocephalic and atraumatic.   Eyes: EOM are normal.   Neck: Normal range of motion. Neck supple. No JVD present.   Cardiovascular: Normal rate and regular rhythm.   Pulmonary/Chest: Effort normal.   Abdominal: Soft. Bowel sounds are normal. He exhibits no distension. There is no guarding.   Musculoskeletal: Normal range of motion. He exhibits no edema or deformity.   Neurological: He is alert and oriented to person, place, and time.   Psychiatric: He has a normal mood and affect. His behavior is normal. Judgment and thought content normal.   Nursing note and vitals reviewed.        CRANIAL NERVES     CN III, IV, VI   Extraocular motions are normal.        Significant Labs: .  Recent Labs   Lab 09/08/19  1142 09/09/19  0508   WBC 6.78 5.75  5.75   HGB 12.3* 11.0*  11.0*   HCT 36.7* 33.3*  33.3*    277  277     Recent Labs   Lab 09/08/19  1142 09/09/19  0508    137  137   K 5.3* 4.4  4.4    107  107   CO2 21* 22*  22*   BUN 36* 35*  35*   CREATININE 2.34* 1.5*  1.5*   * 151*  151*   CALCIUM 9.1 8.5*  8.5*   MG  --  2.3   PHOS  --  4.9*     Recent Labs   Lab 09/08/19  1142 09/08/19  2349 09/09/19  0508   ALKPHOS 73  --  66   ALT 25  --  18   AST 25  --  16   ALBUMIN 4.3  --  3.6   PROT 7.6  --  6.5   BILITOT 0.4  --  0.5   INR  --  1.0  --       Recent Labs     09/08/19  1142 09/08/19  1743 09/08/19  2349   TROPONINI 0.014 0.018 0.013  0.013  0.013     Recent Labs   Lab 09/09/19  0547 09/09/19  1320   POCTGLUCOSE 135* 169*     Hemoglobin  A1C   Date Value Ref Range Status   09/08/2019 7.2 (H) 4.0 - 5.6 % Final     Comment:     ADA Screening Guidelines:  5.7-6.4%  Consistent with prediabetes  >or=6.5%  Consistent with diabetes  High levels of fetal hemoglobin interfere with the HbA1C  assay. Heterozygous hemoglobin variants (HbS, HgC, etc)do  not significantly interfere with this assay.   However, presence of multiple variants may affect accuracy.       Scheduled Meds:   [START ON 9/10/2019] benazepril  20 mg Oral Daily    And    [START ON 9/10/2019] amLODIPine  5 mg Oral Daily    aspirin  81 mg Oral Daily    atorvastatin  40 mg Oral QHS    cilostazol  50 mg Oral BID    clopidogrel  75 mg Oral Daily    polyethylene glycol  17 g Oral Daily     Continuous Infusions:   dextrose 5% lactated ringers       As Needed: acetaminophen, acetaminophen, Dextrose 10% Bolus, glucagon (human recombinant), glucagon (human recombinant), glucose, glucose, insulin aspart U-100, ondansetron, ramelteon, sodium chloride 0.9%    Significant Imaging: will review      Assessment/Plan:      * Bradycardia  Hold BB, CaCB  Consult cards  Trop x 3  tele    Cards on board, 2 d echo, nuc stress test, +/- pacemaker    Essential hypertension  stable      Type 2 diabetes mellitus without complication, without long-term current use of insulin  ISS for now      Chest pain    tightness  consulted [facility] cards already    WANG (acute kidney injury)  F/u cr level  Cr 1.5 from 2.3, will hydrate gently      VTE Risk Mitigation (From admission, onward)        Ordered     IP VTE HIGH RISK PATIENT  Once      09/08/19 1711     Place JUDSON hose  Until discontinued      09/08/19 1711     Place sequential compression device  Until discontinued      09/08/19 1711     Reason for No Pharmacological VTE Prophylaxis  Once      09/08/19 1711                Joey Naylor DO  Department of Hospital Medicine   [facility]

## 2019-09-09 NOTE — NURSING
Progress notes reviewed. Rounds completed. Introduced self as VN for this shift. Educated pt on VN's role in pt care. Educated pt about VTE and fall precautions.  Opportunity given for pt's questions. Patient requesting a diet. Messaged MD, awaiting reply. Will continue to monitor.       09/09/19 1300   Type of Frequent Check   Type Patient Rounds  (VN Rounds)   Safety/Activity   Patient Rounds bed in low position;bed wheels locked;call light in patient/parent reach;ID band on;visualized patient   Safety Promotion/Fall Prevention bed alarm set;assistive device/personal item within reach;family to remain at bedside   Activity Management ambulated in room - L4   Positioning   Body Position positioned/repositioned independently   Head of Bed (HOB) HOB at 45 degrees   Positioning/Transfer Devices pillows;in use   Pain/Comfort/Sleep   Preferred Pain Scale number (Numeric Rating Pain Scale)   Comfort/Acceptable Pain Level 0   Pain Rating (0-10): Rest 0   Pain Rating (0-10): Activity 0   Sleep/Rest/Relaxation awake

## 2019-09-09 NOTE — PLAN OF CARE
Problem: Adult Inpatient Plan of Care  Goal: Plan of Care Review  Outcome: Revised  Pt stable. NO distress noted. POC reviewed with pt. Pt verbalized understanding. Pt remains SB with 2nd degree AV block on the monitor. Hr as low as 30 but did not sustain.NO true red alarms noted. Pt no longer c./o chest pressure. Pt placed NPO after midnight. Blood glucose monitored. Fall precautions maintained. Bed in lowest position, call light in reach and bed alarm on.

## 2019-09-09 NOTE — PLAN OF CARE
"Notified by bedside nurse that patient feels "groggy" at this time and heart rate dipped to 30 on tele.  Heart rate mostly maintaining 34-40.  No chest "pain" per patient.  VN sent secure chat to PHILLY Andres NP notifying her of heart rate and patient stating that he feels groggy.  Awaiting heart rate parameter orders and further orders  "

## 2019-09-09 NOTE — ASSESSMENT & PLAN NOTE
-SBP 130s-170s overnight  -on Norvasc and Benazepril at home doses; on Coreg BID at home but hold due to bradycardia/AVB  -no plans for adjustment of medication regimen for now to avoid hypotension in setting of WANG

## 2019-09-09 NOTE — ASSESSMENT & PLAN NOTE
-creatinine 2.3 upon admission  -down to 1.5 this AM with no intervention  -etiology multifactoral and possibly related to pre renal etiology with low fluid volume vs low CO in setting of bradycardia  -would not be opposed to gentle IVFs if okay with primary team

## 2019-09-09 NOTE — PROGRESS NOTES
Pharmacy New Medication Education    Patient and/or Caregiver ACCEPTED medication education.    Pharmacy has provided education on the name, indication, and possible side effects of the medication(s) prescribed, using teach-back method.     Learners of pharmacy medication education includes:  spouse    Medication Indication Side Effects   benazepril Manage BP headache and cough    lipitor Manage cholesterol diarrhea and muscle cramps        The following medications have also been discussed, during this admission.     Current Facility-Administered Medications   Medication Frequency    acetaminophen tablet 650 mg Q4H PRN    acetaminophen tablet 650 mg Q8H PRN    [START ON 9/10/2019] benazepril tablet 20 mg Daily    And    [START ON 9/10/2019] amLODIPine tablet 5 mg Daily    aspirin EC tablet 81 mg Daily    atorvastatin tablet 40 mg QHS    cilostazol tablet 50 mg BID    clopidogrel tablet 75 mg Daily    dextrose 10% (D10W) Bolus PRN    dextrose 5 % in lactated ringers infusion Continuous    glucagon (human recombinant) injection 1 mg PRN    glucagon (human recombinant) injection 1 mg PRN    glucose chewable tablet 16 g PRN    glucose chewable tablet 24 g PRN    insulin aspart U-100 pen 0-5 Units QID (AC + HS) PRN    ondansetron injection 4 mg Q8H PRN    polyethylene glycol packet 17 g Daily    ramelteon tablet 8 mg Nightly PRN    sodium chloride 0.9% flush 10 mL PRN          Thank you  Xiang Grayson, PharmD

## 2019-09-09 NOTE — PLAN OF CARE
"VN cued into room for introduction. Informed patient that VN would be working alongside bedside nurse and PCT throughout shift.  Patient and wife informed of plan of care.  Patient states that he continues to have "some chest tightness" but that "the pain is much better than earlier."  Informed patient that EKGs are ordered for any chest pain, and patient stated "but the pain is so much better."  Informed patient to call if symptoms worsen or change.   Bedside nurse to be notified. Education provided and documented under education tab.  Patient educated on safety measures. Call bell in reach and instructed on how to call for assistance.  Patient verbalized all understanding.  Allowed time for questions.       "

## 2019-09-09 NOTE — PLAN OF CARE
Pt independent with ADL's, uses no DME or home health.      Discharge planning brochure provided. White board updated with CM name & contact info.  Pt encouraged to call with any questions or needs. CM will continue to follow patient throughout the transitions of care, and assist with any discharge needs.       09/09/19 1443   Discharge Assessment   Assessment Type Discharge Planning Assessment   Confirmed/corrected address and phone number on facesheet? Yes   Assessment information obtained from? Patient   Communicated expected length of stay with patient/caregiver yes   Prior to hospitilization cognitive status: Alert/Oriented   Prior to hospitalization functional status: Independent   Current cognitive status: Alert/Oriented   Current Functional Status: Independent   Lives With spouse   Able to Return to Prior Arrangements yes   Is patient able to care for self after discharge? Yes   Readmission Within the Last 30 Days no previous admission in last 30 days   Patient currently being followed by outpatient case management? No   Patient currently receives any other outside agency services? No   Equipment Currently Used at Home none   Do you have any problems affording any of your prescribed medications? No   Is the patient taking medications as prescribed? yes   Does the patient have transportation home? Yes   Transportation Anticipated family or friend will provide   Does the patient receive services at the [facility]? No   Discharge Plan A Home   Discharge Plan B Home with family   DME Needed Upon Discharge  none   Patient/Family in Agreement with Plan yes       Jazmine Fried RN    [phone]

## 2019-09-10 VITALS
SYSTOLIC BLOOD PRESSURE: 173 MMHG | HEIGHT: 69 IN | BODY MASS INDEX: 29.88 KG/M2 | OXYGEN SATURATION: 96 % | WEIGHT: 201.75 LBS | TEMPERATURE: 96 F | RESPIRATION RATE: 20 BRPM | HEART RATE: 73 BPM | DIASTOLIC BLOOD PRESSURE: 99 MMHG

## 2019-09-10 DIAGNOSIS — I44.1 AV BLOCK, MOBITZ 1: Primary | ICD-10-CM

## 2019-09-10 LAB
ALBUMIN SERPL BCP-MCNC: 3.6 G/DL (ref 3.5–5.2)
ALP SERPL-CCNC: 75 U/L (ref 55–135)
ALT SERPL W/O P-5'-P-CCNC: 21 U/L (ref 10–44)
ANION GAP SERPL CALC-SCNC: 7 MMOL/L (ref 8–16)
ANION GAP SERPL CALC-SCNC: 7 MMOL/L (ref 8–16)
AST SERPL-CCNC: 19 U/L (ref 10–40)
BASOPHILS # BLD AUTO: 0.02 K/UL (ref 0–0.2)
BASOPHILS # BLD AUTO: 0.02 K/UL (ref 0–0.2)
BASOPHILS NFR BLD: 0.4 % (ref 0–1.9)
BASOPHILS NFR BLD: 0.4 % (ref 0–1.9)
BILIRUB SERPL-MCNC: 0.5 MG/DL (ref 0.1–1)
BUN SERPL-MCNC: 20 MG/DL (ref 6–20)
BUN SERPL-MCNC: 20 MG/DL (ref 6–20)
CALCIUM SERPL-MCNC: 8.8 MG/DL (ref 8.7–10.5)
CALCIUM SERPL-MCNC: 8.8 MG/DL (ref 8.7–10.5)
CHLORIDE SERPL-SCNC: 107 MMOL/L (ref 95–110)
CHLORIDE SERPL-SCNC: 107 MMOL/L (ref 95–110)
CO2 SERPL-SCNC: 25 MMOL/L (ref 23–29)
CO2 SERPL-SCNC: 25 MMOL/L (ref 23–29)
CREAT SERPL-MCNC: 1.1 MG/DL (ref 0.5–1.4)
CREAT SERPL-MCNC: 1.1 MG/DL (ref 0.5–1.4)
DIFFERENTIAL METHOD: ABNORMAL
DIFFERENTIAL METHOD: ABNORMAL
EOSINOPHIL # BLD AUTO: 0.3 K/UL (ref 0–0.5)
EOSINOPHIL # BLD AUTO: 0.3 K/UL (ref 0–0.5)
EOSINOPHIL NFR BLD: 5.3 % (ref 0–8)
EOSINOPHIL NFR BLD: 5.3 % (ref 0–8)
ERYTHROCYTE [DISTWIDTH] IN BLOOD BY AUTOMATED COUNT: 12.1 % (ref 11.5–14.5)
ERYTHROCYTE [DISTWIDTH] IN BLOOD BY AUTOMATED COUNT: 12.1 % (ref 11.5–14.5)
EST. GFR  (AFRICAN AMERICAN): >60 ML/MIN/1.73 M^2
EST. GFR  (AFRICAN AMERICAN): >60 ML/MIN/1.73 M^2
EST. GFR  (NON AFRICAN AMERICAN): >60 ML/MIN/1.73 M^2
EST. GFR  (NON AFRICAN AMERICAN): >60 ML/MIN/1.73 M^2
GLUCOSE SERPL-MCNC: 176 MG/DL (ref 70–110)
GLUCOSE SERPL-MCNC: 176 MG/DL (ref 70–110)
HCT VFR BLD AUTO: 35.2 % (ref 40–54)
HCT VFR BLD AUTO: 35.2 % (ref 40–54)
HGB BLD-MCNC: 11.7 G/DL (ref 14–18)
HGB BLD-MCNC: 11.7 G/DL (ref 14–18)
LYMPHOCYTES # BLD AUTO: 1.2 K/UL (ref 1–4.8)
LYMPHOCYTES # BLD AUTO: 1.2 K/UL (ref 1–4.8)
LYMPHOCYTES NFR BLD: 21.2 % (ref 18–48)
LYMPHOCYTES NFR BLD: 21.2 % (ref 18–48)
MAGNESIUM SERPL-MCNC: 1.9 MG/DL (ref 1.6–2.6)
MCH RBC QN AUTO: 34.3 PG (ref 27–31)
MCH RBC QN AUTO: 34.3 PG (ref 27–31)
MCHC RBC AUTO-ENTMCNC: 33.2 G/DL (ref 32–36)
MCHC RBC AUTO-ENTMCNC: 33.2 G/DL (ref 32–36)
MCV RBC AUTO: 103 FL (ref 82–98)
MCV RBC AUTO: 103 FL (ref 82–98)
MONOCYTES # BLD AUTO: 0.8 K/UL (ref 0.3–1)
MONOCYTES # BLD AUTO: 0.8 K/UL (ref 0.3–1)
MONOCYTES NFR BLD: 14.5 % (ref 4–15)
MONOCYTES NFR BLD: 14.5 % (ref 4–15)
NEUTROPHILS # BLD AUTO: 3.4 K/UL (ref 1.8–7.7)
NEUTROPHILS # BLD AUTO: 3.4 K/UL (ref 1.8–7.7)
NEUTROPHILS NFR BLD: 58.6 % (ref 38–73)
NEUTROPHILS NFR BLD: 58.6 % (ref 38–73)
PHOSPHATE SERPL-MCNC: 3.3 MG/DL (ref 2.7–4.5)
PLATELET # BLD AUTO: 281 K/UL (ref 150–350)
PLATELET # BLD AUTO: 281 K/UL (ref 150–350)
PMV BLD AUTO: 10.4 FL (ref 9.2–12.9)
PMV BLD AUTO: 10.4 FL (ref 9.2–12.9)
POTASSIUM SERPL-SCNC: 4.5 MMOL/L (ref 3.5–5.1)
POTASSIUM SERPL-SCNC: 4.5 MMOL/L (ref 3.5–5.1)
PROT SERPL-MCNC: 6.8 G/DL (ref 6–8.4)
RBC # BLD AUTO: 3.41 M/UL (ref 4.6–6.2)
RBC # BLD AUTO: 3.41 M/UL (ref 4.6–6.2)
SODIUM SERPL-SCNC: 139 MMOL/L (ref 136–145)
SODIUM SERPL-SCNC: 139 MMOL/L (ref 136–145)
WBC # BLD AUTO: 5.71 K/UL (ref 3.9–12.7)
WBC # BLD AUTO: 5.71 K/UL (ref 3.9–12.7)

## 2019-09-10 PROCEDURE — 36415 COLL VENOUS BLD VENIPUNCTURE: CPT

## 2019-09-10 PROCEDURE — 99233 PR SUBSEQUENT HOSPITAL CARE,LEVL III: ICD-10-PCS | Mod: ,,, | Performed by: NURSE PRACTITIONER

## 2019-09-10 PROCEDURE — G0378 HOSPITAL OBSERVATION PER HR: HCPCS

## 2019-09-10 PROCEDURE — 84100 ASSAY OF PHOSPHORUS: CPT

## 2019-09-10 PROCEDURE — 99233 SBSQ HOSP IP/OBS HIGH 50: CPT | Mod: ,,, | Performed by: NURSE PRACTITIONER

## 2019-09-10 PROCEDURE — 94761 N-INVAS EAR/PLS OXIMETRY MLT: CPT

## 2019-09-10 PROCEDURE — 93010 ELECTROCARDIOGRAM REPORT: CPT | Mod: ,,, | Performed by: INTERNAL MEDICINE

## 2019-09-10 PROCEDURE — 25000003 PHARM REV CODE 250: Performed by: HOSPITALIST

## 2019-09-10 PROCEDURE — 93005 ELECTROCARDIOGRAM TRACING: CPT

## 2019-09-10 PROCEDURE — 80053 COMPREHEN METABOLIC PANEL: CPT

## 2019-09-10 PROCEDURE — 85025 COMPLETE CBC W/AUTO DIFF WBC: CPT

## 2019-09-10 PROCEDURE — 93010 EKG 12-LEAD: ICD-10-PCS | Mod: ,,, | Performed by: INTERNAL MEDICINE

## 2019-09-10 PROCEDURE — 83735 ASSAY OF MAGNESIUM: CPT

## 2019-09-10 RX ADMIN — AMLODIPINE BESYLATE 5 MG: 5 TABLET ORAL at 08:09

## 2019-09-10 RX ADMIN — CLOPIDOGREL BISULFATE 75 MG: 75 TABLET ORAL at 08:09

## 2019-09-10 RX ADMIN — BENAZEPRIL HYDROCHLORIDE 20 MG: 20 TABLET, COATED ORAL at 08:09

## 2019-09-10 RX ADMIN — CILOSTAZOL 50 MG: 50 TABLET ORAL at 08:09

## 2019-09-10 RX ADMIN — ACETAMINOPHEN 650 MG: 325 TABLET ORAL at 08:09

## 2019-09-10 RX ADMIN — ASPIRIN 81 MG: 81 TABLET, COATED ORAL at 08:09

## 2019-09-10 RX ADMIN — POLYETHYLENE GLYCOL 3350 17 G: 17 POWDER, FOR SOLUTION ORAL at 08:09

## 2019-09-10 NOTE — PLAN OF CARE
Problem: Adult Inpatient Plan of Care  Goal: Plan of Care Review  Outcome: Ongoing (interventions implemented as appropriate)  Plan of care reviewed patient verbalized understanding. Medications administered. IV fluid infused D5LR at 75 ml/hr. Pacer pads in place. Blood glucose monitoring per sliding scale. Safety maintained. Bed in the lowest positon, call bell within reach, bed alarm on.

## 2019-09-10 NOTE — ASSESSMENT & PLAN NOTE
Hold BB  Restart CCB. Appreciates cardiology rec's tele     nuc stress test reported no evidence for reversible ischemia. Normal left global ventricular systolic function with an LV ejection fraction of 67 %.  Appreciates cardiology rec's - no justin for pacemaker for now.

## 2019-09-10 NOTE — PLAN OF CARE
Problem: Adult Inpatient Plan of Care  Goal: Plan of Care Review  Room air SpO2   96 %. Pt with no apparent distess noted. Will continue to monitor.

## 2019-09-10 NOTE — PLAN OF CARE
Problem: Adult Inpatient Plan of Care  Goal: Plan of Care Review  Outcome: Ongoing (interventions implemented as appropriate)  Plan of care reviewed with patient.Pt asleep thru the night. Responds to verbal stimuli. Pacer pads in place.  Verbalizes to staff understanding.Episodes of bradycardic as low as 36 HR. Pt with 0 acute distress observed at this time.Denies any discomfort at this time. Side rails x2, bed in low position, call bell within reach. Bed alarm in place for patient safety. Will relay to oncoming nurse to monitor for changes in condition.

## 2019-09-10 NOTE — ASSESSMENT & PLAN NOTE
-creatinine 2.3 upon admission  -down to 1.1 this AM from 1.5 yesterday   -etiology multifactoral and possibly related to pre renal etiology with low fluid volume vs low CO in setting of bradycardia  -okay with continuation of ACEI upon discharge

## 2019-09-10 NOTE — ASSESSMENT & PLAN NOTE
-2nd degree AVB type I upon admission confirmed per EKG   -BB discontinued; monitor on telemetry with resolution of 2nd AVB overnight; currently NSR with 1st degree AVB with HR in the 70s  -likely BB induced; will remain off Coreg permanently and no plans for pacemaker as of now  -reviewed with patient and wife possibility of recurrence in future and possibility of needing permanent pacemaker if recurrent AVB occurs without BB -will arrange for 48 hour Holter monitor as an outpatient

## 2019-09-10 NOTE — SUBJECTIVE & OBJECTIVE
Review of Systems   Constitution: Negative for chills, decreased appetite, diaphoresis and fever.   Cardiovascular: Negative for chest pain, claudication, cyanosis, dyspnea on exertion, irregular heartbeat, leg swelling, near-syncope, orthopnea, palpitations, paroxysmal nocturnal dyspnea and syncope.   Respiratory: Negative for cough, hemoptysis, shortness of breath and wheezing.    Gastrointestinal: Negative for bloating, abdominal pain, constipation, diarrhea, melena, nausea and vomiting.   Neurological: Negative for dizziness and weakness.     Objective:     Vital Signs (Most Recent):  Temp: 96.4 °F (35.8 °C) (09/10/19 0743)  Pulse: 73 (09/10/19 0745)  Resp: 20 (09/10/19 0743)  BP: (!) 173/99 (09/10/19 0743)  SpO2: 96 % (09/10/19 0800) Vital Signs (24h Range):  Temp:  [96.4 °F (35.8 °C)-98.5 °F (36.9 °C)] 96.4 °F (35.8 °C)  Pulse:  [50-73] 73  Resp:  [18-20] 20  SpO2:  [94 %-97 %] 96 %  BP: (154-178)/(72-99) 173/99     Weight: 91.5 kg (201 lb 11.5 oz)  Body mass index is 29.79 kg/m².     SpO2: 96 %  O2 Device (Oxygen Therapy): room air      Intake/Output Summary (Last 24 hours) at 9/10/2019 0948  Last data filed at 9/10/2019 0756  Gross per 24 hour   Intake 485 ml   Output 1525 ml   Net -1040 ml       Lines/Drains/Airways     Peripheral Intravenous Line                 Peripheral IV - Single Lumen 09/08/19 1144 18 G Left Antecubital 1 day                Physical Exam   Constitutional: He is oriented to person, place, and time. He appears well-developed and well-nourished. No distress.   Cardiovascular: Normal rate and regular rhythm. Exam reveals no gallop.   No murmur heard.  Pulmonary/Chest: Effort normal and breath sounds normal. No respiratory distress. He has no wheezes.   Abdominal: Soft. Bowel sounds are normal. He exhibits no distension. There is no tenderness.   Neurological: He is alert and oriented to person, place, and time.   Skin: Skin is warm and dry.       Significant Labs:     Recent Labs    Lab 09/10/19  0607   WBC 5.71  5.71   RBC 3.41*  3.41*   HGB 11.7*  11.7*   HCT 35.2*  35.2*     281   *  103*   MCH 34.3*  34.3*   MCHC 33.2  33.2     Recent Labs   Lab 09/10/19  0607     139   K 4.5  4.5     107   CO2 25  25   BUN 20  20   CREATININE 1.1  1.1   MG 1.9       Significant Imaging: Echocardiogram:   Transthoracic echo (TTE) complete (Cupid Only):   Results for orders placed or performed during the hospital encounter of 09/08/19   Echo Color Flow Doppler? Yes   Result Value Ref Range    BSA 2.15 m2    LA WIDTH 3.55 cm    AORTIC VALVE CUSP SEPERATION 1.52 cm    PV PEAK VELOCITY 1.49 cm/s    LVIDD 4.77 3.5 - 6.0 cm    IVS 1.14 (A) 0.6 - 1.1 cm    PW 1.06 0.6 - 1.1 cm    Ao root annulus 3.30 cm    LVIDS 2.85 2.1 - 4.0 cm    FS 40 28 - 44 %    LA volume 72.60 cm3    LV mass 192.02 g    LA size 4.29 cm    RVDD 2.92 cm    Left Ventricle Relative Wall Thickness 0.44 cm    AV mean gradient 19 mmHg    AV valve area 1.44 cm2    AV Velocity Ratio 0.46     AV index (prosthetic) 0.52     E/A ratio 1.84     E wave decelartion time 160.17 msec    IVRT 0.05 msec    Pulm vein S/D ratio 1.48     LVOT diameter 1.88 cm    LVOT area 2.8 cm2    LVOT peak logan 1.40 m/s    LVOT peak VTI 31.88 cm    Ao peak logan 3.07 m/s    Ao VTI 61.50 cm    LVOT stroke volume 88.45 cm3    AV peak gradient 38 mmHg    MV Peak E Logan 1.71 m/s    TR Max Logan 1.77 m/s    MV Peak A Logan 0.93 m/s    PV Peak S Logan 0.65 m/s    PV Peak D Logan 0.44 m/s    LV Systolic Volume 30.81 mL    LV Systolic Volume Index 14.9 mL/m2    LV Diastolic Volume 105.92 mL    LV Diastolic Volume Index 51.09 mL/m2    LA Volume Index 35.0 mL/m2    LV Mass Index 93 g/m2    RA Major Axis 5.83 cm    Left Atrium Minor Axis 5.93 cm    Left Atrium Major Axis 5.32 cm    Triscuspid Valve Regurgitation Peak Gradient 13 mmHg    Right Atrial Pressure (from IVC) 8 mmHg    TV rest pulmonary artery pressure 21 mmHg    Narrative    · Increased  (hyperdynamic) left ventricular systolic function. The   estimated ejection fraction is 65%  · Concentric left ventricular remodeling.  · Grade II (moderate) left ventricular diastolic dysfunction consistent   with pseudonormalization.  · No wall motion abnormalities.  · Normal right ventricular systolic function.  · Mild left atrial enlargement.  · Mild right atrial enlargement.  · Elevated left atrial pressure.  · Mild-to-moderate aortic valve stenosis.  · Aortic valve area is 1.44 cm2; peak velocity is 3.07 m/s; mean gradient   is 19 mmHg.  · The estimated PA systolic pressure is 21 mm Hg  · Intermediate central venous pressure (8 mm Hg).

## 2019-09-10 NOTE — PLAN OF CARE
AVS and educational attachments shared with patient and wife via [employer]. Discussed thoroughly. Patient and wife verbalized clear understanding using teach back method. Notified bedside nurse of completion of education. At present no distress noted. Patient to be discharged via w/c with escort service and family with all of patient's belonings. Will cont to be available to patient and family and intervene prn.

## 2019-09-10 NOTE — ASSESSMENT & PLAN NOTE
-some typical and some atypical features  -troponin .014-.018-.013; EKG with 2nd degree AVB but no acute STTW abnormalities  -history of CAD along with HTN, HLP and DMII; concerned about ischemic etiology-stress test negative  -no recurrent chest pain overnight; given negative stress test and negative CE no further evaluation needed  -continue ASA, statin, Plavix and ACEI; will remain off BB due to bradycardia/AVB

## 2019-09-10 NOTE — PROGRESS NOTES
[facility] Medicine  Progress Note    Patient Name: Shimon Ch Jr.  MRN: [id number]  Patient Class: IP- Inpatient   Admission Date: 9/8/2019  Length of Stay: 2 days  Attending Physician: Katya Beckett*  Primary Care Provider: Sabino Ruiz MD        Subjective:     Principal Problem:Bradycardia        HPI:   Patient currently presents with concern regarding bradycardia.  Patient notably reports the onset of chest pain last evening but describes that this resolved after a short period of time.  Patient notes that his heart rate was in the 40s at that time.  This morning he notes pressure in the chest intermittently but also notes that the heart rate is now in the 30s.  There is no associated shortness of breath or palpitations.  Patient denies any nausea or diaphoresis.  Patient does notably have a history of prior coronary artery disease status post stenting in the past.  He also has a history of diabetes, hypertension, and hyperlipidemia.  He is a patient of Dr. Angela.    ED discussed with Dr Anthony, recommended admitting pt to [facility] and holding BB and ccb. And consult [facility] cards    Overview/Hospital Course:  Pt evaluated he has some chest tightness but no cp, no N,V. Not in distress  9/9 pt still has low HR, cards on board, pt will have nuc stress test and 2 d echo, might need pacemaker. Cr around 1.5, will gently hydrate the pt  9/10 appreciates cardiology recommendations  no pacemaker for now, stress for ischemia evaluation is normal. Restart home BP meds and hold coreg and outpatient FU with Cards for Holter monitor.               Interval History: awake and alert, no reported event. Family by bedside  HR improved this morning   Discussed with cardiology and recommendations noted- no pacemaker for now, stress for ischemia evaluation is normal.     Restart home BP meds and hold coreg   FU outpatient with Cards for Holter monitor.     Review of  Systems   Constitutional: Negative for activity change, chills, fatigue and fever.   Respiratory: Negative for shortness of breath.    Cardiovascular: Negative for chest pain, palpitations and leg swelling.   Gastrointestinal: Negative for abdominal distention, blood in stool, nausea and vomiting.   Musculoskeletal: Negative for arthralgias.   Neurological: Negative for dizziness and tremors.   Psychiatric/Behavioral: Negative for agitation and confusion.     Objective:     Vital Signs (Most Recent):  Temp: 96.4 °F (35.8 °C) (09/10/19 0743)  Pulse: 73 (09/10/19 0745)  Resp: 20 (09/10/19 0743)  BP: (!) 173/99 (09/10/19 0743)  SpO2: 96 % (09/10/19 0800) Vital Signs (24h Range):  Temp:  [96.4 °F (35.8 °C)-98.5 °F (36.9 °C)] 96.4 °F (35.8 °C)  Pulse:  [50-73] 73  Resp:  [18-20] 20  SpO2:  [94 %-97 %] 96 %  BP: (154-178)/(72-99) 173/99     Weight: 91.5 kg (201 lb 11.5 oz)  Body mass index is 29.79 kg/m².    Intake/Output Summary (Last 24 hours) at 9/10/2019 0946  Last data filed at 9/10/2019 0756  Gross per 24 hour   Intake 485 ml   Output 1525 ml   Net -1040 ml      Physical Exam   Constitutional: He is oriented to person, place, and time. He appears well-developed and well-nourished.   HENT:   Head: Normocephalic and atraumatic.   Neck: Neck supple.   Cardiovascular: Normal rate and regular rhythm.   Pulmonary/Chest: Effort normal.   Abdominal: Soft. Bowel sounds are normal. He exhibits no distension.   Musculoskeletal: Normal range of motion. He exhibits no edema or deformity.   Neurological: He is alert and oriented to person, place, and time.   Psychiatric: He has a normal mood and affect. His behavior is normal. Judgment and thought content normal.   Nursing note and vitals reviewed.      Significant Labs:   ABGs: No results for input(s): PH, PCO2, HCO3, POCSATURATED, BE, TOTALHB, COHB, METHB, O2HB, POCFIO2 in the last 48 hours.  CBC:   Recent Labs   Lab 09/08/19  1142 09/09/19  0508 09/10/19  0607   WBC 6.78 5.75   5.75 5.71  5.71   HGB 12.3* 11.0*  11.0* 11.7*  11.7*   HCT 36.7* 33.3*  33.3* 35.2*  35.2*    277  277 281  281     CMP:   Recent Labs   Lab 09/08/19  1142 09/09/19  0508 09/10/19  0607    137  137 139  139   K 5.3* 4.4  4.4 4.5  4.5    107  107 107  107   CO2 21* 22*  22* 25  25   * 151*  151* 176*  176*   BUN 36* 35*  35* 20  20   CREATININE 2.34* 1.5*  1.5* 1.1  1.1   CALCIUM 9.1 8.5*  8.5* 8.8  8.8   PROT 7.6 6.5 6.8   ALBUMIN 4.3 3.6 3.6   BILITOT 0.4 0.5 0.5   ALKPHOS 73 66 75   AST 25 16 19   ALT 25 18 21   ANIONGAP 11 8  8 7*  7*   EGFRNONAA 29.1* 50*  50* >60  >60     TSH: No results for input(s): TSH in the last 4320 hours.  Urine Culture: No results for input(s): LABURIN in the last 48 hours.  Urine Studies: No results for input(s): COLORU, APPEARANCEUA, PHUR, SPECGRAV, PROTEINUA, GLUCUA, KETONESU, BILIRUBINUA, OCCULTUA, NITRITE, UROBILINOGEN, LEUKOCYTESUR, RBCUA, WBCUA, BACTERIA, SQUAMEPITHEL, HYALINECASTS in the last 48 hours.    Invalid input(s): LAYLA    Significant Imaging: I have reviewed all pertinent imaging results/findings within the past 24 hours.      Assessment/Plan:      * Bradycardia  Hold BB, CaCB  Consult cards  Trop x 3  tele    Cards on board, 2 d echo, nuc stress test, +/- pacemaker    WANG (acute kidney injury)  F/u cr level  Cr 1.5 from 2.3, will hydrate gently    Chest pain    tightness  consulted [facility] cards already    Type 2 diabetes mellitus without complication, without long-term current use of insulin  ISS for now      Essential hypertension  stable        VTE Risk Mitigation (From admission, onward)        Ordered     IP VTE HIGH RISK PATIENT  Once      09/08/19 1711     Place JUDSON hose  Until discontinued      09/08/19 1711     Place sequential compression device  Until discontinued      09/08/19 1711     Reason for No Pharmacological VTE Prophylaxis  Once      09/08/19 1711                Katya N Innocent-Ituah,  MD  Department of [facility] Medicine   [facility]

## 2019-09-10 NOTE — SUBJECTIVE & OBJECTIVE
Interval History: awake and alert, no reported event. Family by bedside  HR improved this morning   Discussed with cardiology and recommendations noted- no pacemaker for now, stress for ischemia evaluation is normal.     Restart home BP meds and hold coreg   FU outpatient with Cards for Holter monitor.     Review of Systems   Constitutional: Negative for activity change, chills, fatigue and fever.   Respiratory: Negative for shortness of breath.    Cardiovascular: Negative for chest pain, palpitations and leg swelling.   Gastrointestinal: Negative for abdominal distention, blood in stool, nausea and vomiting.   Musculoskeletal: Negative for arthralgias.   Neurological: Negative for dizziness and tremors.   Psychiatric/Behavioral: Negative for agitation and confusion.     Objective:     Vital Signs (Most Recent):  Temp: 96.4 °F (35.8 °C) (09/10/19 0743)  Pulse: 73 (09/10/19 0745)  Resp: 20 (09/10/19 0743)  BP: (!) 173/99 (09/10/19 0743)  SpO2: 96 % (09/10/19 0800) Vital Signs (24h Range):  Temp:  [96.4 °F (35.8 °C)-98.5 °F (36.9 °C)] 96.4 °F (35.8 °C)  Pulse:  [50-73] 73  Resp:  [18-20] 20  SpO2:  [94 %-97 %] 96 %  BP: (154-178)/(72-99) 173/99     Weight: 91.5 kg (201 lb 11.5 oz)  Body mass index is 29.79 kg/m².    Intake/Output Summary (Last 24 hours) at 9/10/2019 0946  Last data filed at 9/10/2019 0756  Gross per 24 hour   Intake 485 ml   Output 1525 ml   Net -1040 ml      Physical Exam   Constitutional: He is oriented to person, place, and time. He appears well-developed and well-nourished.   HENT:   Head: Normocephalic and atraumatic.   Neck: Neck supple.   Cardiovascular: Normal rate and regular rhythm.   Pulmonary/Chest: Effort normal.   Abdominal: Soft. Bowel sounds are normal. He exhibits no distension.   Musculoskeletal: Normal range of motion. He exhibits no edema or deformity.   Neurological: He is alert and oriented to person, place, and time.   Psychiatric: He has a normal mood and affect. His behavior  is normal. Judgment and thought content normal.   Nursing note and vitals reviewed.      Significant Labs:   ABGs: No results for input(s): PH, PCO2, HCO3, POCSATURATED, BE, TOTALHB, COHB, METHB, O2HB, POCFIO2 in the last 48 hours.  CBC:   Recent Labs   Lab 09/08/19  1142 09/09/19  0508 09/10/19  0607   WBC 6.78 5.75  5.75 5.71  5.71   HGB 12.3* 11.0*  11.0* 11.7*  11.7*   HCT 36.7* 33.3*  33.3* 35.2*  35.2*    277  277 281  281     CMP:   Recent Labs   Lab 09/08/19  1142 09/09/19  0508 09/10/19  0607    137  137 139  139   K 5.3* 4.4  4.4 4.5  4.5    107  107 107  107   CO2 21* 22*  22* 25  25   * 151*  151* 176*  176*   BUN 36* 35*  35* 20  20   CREATININE 2.34* 1.5*  1.5* 1.1  1.1   CALCIUM 9.1 8.5*  8.5* 8.8  8.8   PROT 7.6 6.5 6.8   ALBUMIN 4.3 3.6 3.6   BILITOT 0.4 0.5 0.5   ALKPHOS 73 66 75   AST 25 16 19   ALT 25 18 21   ANIONGAP 11 8  8 7*  7*   EGFRNONAA 29.1* 50*  50* >60  >60     TSH: No results for input(s): TSH in the last 4320 hours.  Urine Culture: No results for input(s): LABURIN in the last 48 hours.  Urine Studies: No results for input(s): COLORU, APPEARANCEUA, PHUR, SPECGRAV, PROTEINUA, GLUCUA, KETONESU, BILIRUBINUA, OCCULTUA, NITRITE, UROBILINOGEN, LEUKOCYTESUR, RBCUA, WBCUA, BACTERIA, SQUAMEPITHEL, HYALINECASTS in the last 48 hours.    Invalid input(s): WRIGHTSUR    Significant Imaging: I have reviewed all pertinent imaging results/findings within the past 24 hours.

## 2019-09-10 NOTE — PROGRESS NOTES
[facility]  Cardiology  Progress Note    Patient Name: Shimon Ch Jr.  MRN: [id number]  Admission Date: 9/8/2019  Hospital Length of Stay: 2 days  Code Status: Full Code   Attending Physician: Katya Beckett*   Primary Care Physician: Sabino Ruiz MD  Expected Discharge Date: 9/10/2019  Principal Problem:Bradycardia    Subjective:     Hospital Course:   9/8/2019 Presented with complaints of chest tightness and low HR. HR noted to be in 30s with EKG with 2nd degree AVB type I. BP stable. Initial troponin .014. BMP with creatinine 2.3. Admitted to [facility] for further evaluation   9/9/2019 No recurrent chest pain/tightness overnight. Creatinine down to 1.5 from 2.3 yesterday. HR remains 40s with 2nd degree AVB type I. CBC with stable H&H. BNP 1270. Plan for nuclear stress test vs MUNDO and echocardiogram. Will continue to hold BB  9/10/2019  Creatinine trended down to 1.1 from 2.3 upon admission. Stress test with no evidence of ischemia and echo with normal LVEF with mild to moderate AS (NESSA 1.44cm2 MG 18mmHg). HR overnight per Epic 50s. Reviewed telemetry with SB overnight with HR 70s NSR this AM with 1st degree AVB confirmed by EKG. Plan to ambulate this AM and discharge today with complete discontinuation of Coreg BID and will arrange for Holter monitor as an outpatient        Review of Systems   Constitution: Negative for chills, decreased appetite, diaphoresis and fever.   Cardiovascular: Negative for chest pain, claudication, cyanosis, dyspnea on exertion, irregular heartbeat, leg swelling, near-syncope, orthopnea, palpitations, paroxysmal nocturnal dyspnea and syncope.   Respiratory: Negative for cough, hemoptysis, shortness of breath and wheezing.    Gastrointestinal: Negative for bloating, abdominal pain, constipation, diarrhea, melena, nausea and vomiting.   Neurological: Negative for dizziness and weakness.     Objective:     Vital Signs (Most Recent):  Temp:  96.4 °F (35.8 °C) (09/10/19 0743)  Pulse: 73 (09/10/19 0745)  Resp: 20 (09/10/19 0743)  BP: (!) 173/99 (09/10/19 0743)  SpO2: 96 % (09/10/19 0800) Vital Signs (24h Range):  Temp:  [96.4 °F (35.8 °C)-98.5 °F (36.9 °C)] 96.4 °F (35.8 °C)  Pulse:  [50-73] 73  Resp:  [18-20] 20  SpO2:  [94 %-97 %] 96 %  BP: (154-178)/(72-99) 173/99     Weight: 91.5 kg (201 lb 11.5 oz)  Body mass index is 29.79 kg/m².     SpO2: 96 %  O2 Device (Oxygen Therapy): room air      Intake/Output Summary (Last 24 hours) at 9/10/2019 0948  Last data filed at 9/10/2019 0756  Gross per 24 hour   Intake 485 ml   Output 1525 ml   Net -1040 ml       Lines/Drains/Airways     Peripheral Intravenous Line                 Peripheral IV - Single Lumen 09/08/19 1144 18 G Left Antecubital 1 day                Physical Exam   Constitutional: He is oriented to person, place, and time. He appears well-developed and well-nourished. No distress.   Cardiovascular: Normal rate and regular rhythm. Exam reveals no gallop.   No murmur heard.  Pulmonary/Chest: Effort normal and breath sounds normal. No respiratory distress. He has no wheezes.   Abdominal: Soft. Bowel sounds are normal. He exhibits no distension. There is no tenderness.   Neurological: He is alert and oriented to person, place, and time.   Skin: Skin is warm and dry.       Significant Labs:     Recent Labs   Lab 09/10/19  0607   WBC 5.71  5.71   RBC 3.41*  3.41*   HGB 11.7*  11.7*   HCT 35.2*  35.2*     281   *  103*   MCH 34.3*  34.3*   MCHC 33.2  33.2     Recent Labs   Lab 09/10/19  0607     139   K 4.5  4.5     107   CO2 25  25   BUN 20  20   CREATININE 1.1  1.1   MG 1.9       Significant Imaging: Echocardiogram:   Transthoracic echo (TTE) complete (Cupid Only):   Results for orders placed or performed during the hospital encounter of 09/08/19   Echo Color Flow Doppler? Yes   Result Value Ref Range    BSA 2.15 m2    LA WIDTH 3.55 cm    AORTIC VALVE CUSP  SEPERATION 1.52 cm    PV PEAK VELOCITY 1.49 cm/s    LVIDD 4.77 3.5 - 6.0 cm    IVS 1.14 (A) 0.6 - 1.1 cm    PW 1.06 0.6 - 1.1 cm    Ao root annulus 3.30 cm    LVIDS 2.85 2.1 - 4.0 cm    FS 40 28 - 44 %    LA volume 72.60 cm3    LV mass 192.02 g    LA size 4.29 cm    RVDD 2.92 cm    Left Ventricle Relative Wall Thickness 0.44 cm    AV mean gradient 19 mmHg    AV valve area 1.44 cm2    AV Velocity Ratio 0.46     AV index (prosthetic) 0.52     E/A ratio 1.84     E wave decelartion time 160.17 msec    IVRT 0.05 msec    Pulm vein S/D ratio 1.48     LVOT diameter 1.88 cm    LVOT area 2.8 cm2    LVOT peak logan 1.40 m/s    LVOT peak VTI 31.88 cm    Ao peak logan 3.07 m/s    Ao VTI 61.50 cm    LVOT stroke volume 88.45 cm3    AV peak gradient 38 mmHg    MV Peak E Logan 1.71 m/s    TR Max Logan 1.77 m/s    MV Peak A Logan 0.93 m/s    PV Peak S Logan 0.65 m/s    PV Peak D Logan 0.44 m/s    LV Systolic Volume 30.81 mL    LV Systolic Volume Index 14.9 mL/m2    LV Diastolic Volume 105.92 mL    LV Diastolic Volume Index 51.09 mL/m2    LA Volume Index 35.0 mL/m2    LV Mass Index 93 g/m2    RA Major Axis 5.83 cm    Left Atrium Minor Axis 5.93 cm    Left Atrium Major Axis 5.32 cm    Triscuspid Valve Regurgitation Peak Gradient 13 mmHg    Right Atrial Pressure (from IVC) 8 mmHg    TV rest pulmonary artery pressure 21 mmHg    Narrative    · Increased (hyperdynamic) left ventricular systolic function. The   estimated ejection fraction is 65%  · Concentric left ventricular remodeling.  · Grade II (moderate) left ventricular diastolic dysfunction consistent   with pseudonormalization.  · No wall motion abnormalities.  · Normal right ventricular systolic function.  · Mild left atrial enlargement.  · Mild right atrial enlargement.  · Elevated left atrial pressure.  · Mild-to-moderate aortic valve stenosis.  · Aortic valve area is 1.44 cm2; peak velocity is 3.07 m/s; mean gradient   is 19 mmHg.  · The estimated PA systolic pressure is 21 mm Hg  ·  Intermediate central venous pressure (8 mm Hg).        Assessment and Plan:     Brief HPI: Seen on AM NP rounds this morning with wife at the bedside. Denies any complaints currently and denies any recurrent chest pain overnight. Reviewed stress test and echocardiogram findings from yesterday. Reviewed cardiac POC with patient and wife  as detailed below-both verbalized understanding and agree with POC     * Bradycardia  -as detailed previously     AV block, Mobitz 1  -2nd degree AVB type I upon admission confirmed per EKG   -BB discontinued; monitor on telemetry with resolution of 2nd AVB overnight; currently NSR with 1st degree AVB with HR in the 70s  -likely BB induced; will remain off Coreg permanently and no plans for pacemaker as of now  -reviewed with patient and wife possibility of recurrence in future and possibility of needing permanent pacemaker if recurrent AVB occurs without BB -will arrange for 48 hour Holter monitor as an outpatient    WANG (acute kidney injury)  -creatinine 2.3 upon admission  -down to 1.1 this AM from 1.5 yesterday   -etiology multifactoral and possibly related to pre renal etiology with low fluid volume vs low CO in setting of bradycardia  -okay with continuation of ACEI upon discharge     Chest pain  -some typical and some atypical features  -troponin .014-.018-.013; EKG with 2nd degree AVB but no acute STTW abnormalities  -history of CAD along with HTN, HLP and DMII; concerned about ischemic etiology-stress test negative  -no recurrent chest pain overnight; given negative stress test and negative CE no further evaluation needed  -continue ASA, statin, Plavix and ACEI; will remain off BB due to bradycardia/AVB    Essential hypertension  -SBP 160s-170s overnight  -on Norvasc, Benazepril and Coreg BID at home   -BB held due to bradycardia/AVB: no CCB or ACEI given for unknown reasons  -continue CCB and ACEI at home doses and will completely discontinue BB therapy        VTE Risk  Mitigation (From admission, onward)        Ordered     IP VTE HIGH RISK PATIENT  Once      09/08/19 1711     Place JUDSON hose  Until discontinued      09/08/19 1711     Place sequential compression device  Until discontinued      09/08/19 1711     Reason for No Pharmacological VTE Prophylaxis  Once      09/08/19 1711          ESTELA Ramon, ANP  Cardiology  [facility]

## 2019-09-10 NOTE — PLAN OF CARE
D/C rounds complete. All questions answered.  Nurse to discuss d/c medications.  Discussed need to keep f/u appts, adherence to medication regimen for health maintenance, verbalized understanding.    PCP f/u scheduled and placed in AVS.  Cardiology, H&P, labs faxed via [facility] route as requested.  Cardiology to contact pt to schedule holter monitor.  Pt has no additional needs at this time.       09/10/19 1034   Final Note   Assessment Type Final Discharge Note   Anticipated Discharge Disposition Home   Hospital Follow Up  Appt(s) scheduled? Yes   Discharge plans and expectations educations in teach back method with documentation complete? Yes   Right Care Referral Info   Post Acute Recommendation No Care       Jazmine Fried, RN    [phone]

## 2019-09-10 NOTE — DISCHARGE INSTRUCTIONS
Bradycardia (English) View Edit Remove  Blood Pressure, Taking Your (English) View Edit Remove  Diet, Low-Salt (English) View Edit Remove  Herbs and Spices, Using (English) View Edit Remove  4 Steps for Eating Healthier (English) View Edit Remove  Eating Healthy on the Run (English) View Edit Remove  Eating on the Go, Healthy (English) View Edit Remove  Eating Out, Healthy Tips for (English) View Edit Remove  Eating Out, More Healthy Tips (English) View Edit Remove

## 2019-09-10 NOTE — ASSESSMENT & PLAN NOTE
-SBP 160s-170s overnight  -on Norvasc, Benazepril and Coreg BID at home   -BB held due to bradycardia/AVB: no CCB or ACEI given for unknown reasons  -continue CCB and ACEI at home doses and will completely discontinue BB therapy

## 2019-09-10 NOTE — DISCHARGE SUMMARY
[facility] Hospital Medicine  Discharge Summary      Patient Name: Shimon Ch Jr.  MRN: [id number]  Admission Date: 9/8/2019  Hospital Length of Stay: 2 days  Discharge Date and Time: 9/10/2019 11:58 AM  Attending Physician: Katya Beckett*   Discharging Provider: Katya Beckett MD  Primary Care Provider: Sabino Ruiz MD      HPI:    Patient currently presents with concern regarding bradycardia.  Patient notably reports the onset of chest pain last evening but describes that this resolved after a short period of time.  Patient notes that his heart rate was in the 40s at that time.  This morning he notes pressure in the chest intermittently but also notes that the heart rate is now in the 30s.  There is no associated shortness of breath or palpitations.  Patient denies any nausea or diaphoresis.  Patient does notably have a history of prior coronary artery disease status post stenting in the past.  He also has a history of diabetes, hypertension, and hyperlipidemia.  He is a patient of Dr. Angela.    ED discussed with Dr Anthony, recommended admitting pt to [facility]ist and holding BB and ccb. And consult [facility] cards    * No surgery found *      Hospital Course:   Pt evaluated he has some chest tightness but no cp, no N,V. Not in distress  9/9 pt still has low HR, cards on board, pt will have nuc stress test and 2 d echo, might need pacemaker. Cr around 1.5, will gently hydrate the pt  9/10 appreciates cardiology recommendations  no pacemaker for now, stress for ischemia evaluation is normal. Restart home BP meds and hold coreg and outpatient FU with Cards for Holter monitor.                Consults:   Consults (From admission, onward)        Status Ordering Provider     Inpatient consult to Cardiology  Once     Provider:  Grey Angela MD    Completed JEFFERSON LPEE     Inpatient consult to Cardiology-[facility]  Once     Provider:  (Not yet assigned)     Completed MARIAJOSE KEITH     Inpatient consult to Social Work/Case Management  Once     Provider:  (Not yet assigned)    Acknowledged MARIAJOSE KEITH          * Bradycardia  Hold BB  Restart CCB. Appreciates cardiology rec's tele     nuc stress test reported no evidence for reversible ischemia. Normal left global ventricular systolic function with an LV ejection fraction of 67 %.  Appreciates cardiology rec's - no justin for pacemaker for now.     WANG (acute kidney injury)  F/u cr level  Cr 1.5 from 2.3, will hydrate gently- resolved    Chest pain    tightness  consulted [facility] cards already    Type 2 diabetes mellitus without complication, without long-term current use of insulin  ISS for now      Essential hypertension  Discharge on Norvasc and Lotensin  Hold Coreg      Final Active Diagnoses:    Diagnosis Date Noted POA    PRINCIPAL PROBLEM:  Bradycardia [R00.1] 09/08/2019 Yes    AV block, Mobitz 1 [I44.1] 09/09/2019 Yes    Chest pain [R07.9] 09/08/2019 Yes    WANG (acute kidney injury) [N17.9] 09/08/2019 Yes    Essential hypertension [I10] 11/15/2017 Yes    Type 2 diabetes mellitus without complication, without long-term current use of insulin [E11.9] 11/15/2017 Yes      Problems Resolved During this Admission:       Discharged Condition: stable    Disposition: Home or Self Care    Follow Up:  Follow-up Information     Sabino Ruiz MD On 9/17/2019.    Specialty:  Family Medicine  Why:  1:45pm , For Hospital Follow-up  Contact information:  [address]  [address]  [facility] [address] [phone]             ESTELA Ramon, ANP In 1 week.    Specialty:  Cardiology  Why:  The office will contact you to schedule your appointment for holter monitor  Contact information:  [address]  [address]  [facility] [address] [phone]                 Patient Instructions:      Diet diabetic     Diet Cardiac     Activity as tolerated       Significant Diagnostic Studies:     Pending Diagnostic Studies:      None         Medications:  Reconciled Home Medications:      Medication List      CONTINUE taking these medications    amlodipine-benazepril 5-20 mg 5-20 mg per capsule  Commonly known as:  LOTREL  TAKE 1 CAPSULE BY MOUTH EVERY DAY     aspirin 81 MG EC tablet  Commonly known as:  ECOTRIN  Take 81 mg by mouth once daily.     atorvastatin 40 MG tablet  Commonly known as:  LIPITOR  Take 40 mg by mouth every evening.     cilostazol 50 MG Tab  Commonly known as:  PLETAL  TAKE 1 TABLET (50 MG TOTAL) BY MOUTH 2 (TWO) TIMES DAILY.     clopidogrel 75 mg tablet  Commonly known as:  PLAVIX  TAKE 1 TABLET (75 MG TOTAL) BY MOUTH ONCE DAILY.     ferrous sulfate 325 mg (65 mg iron) Tab tablet  Commonly known as:  FEOSOL  Take 325 mg by mouth daily with breakfast.     fish oil-omega-3 fatty acids 300-1,000 mg capsule  Take 1 capsule by mouth once daily.     metFORMIN 500 MG tablet  Commonly known as:  GLUCOPHAGE  Take 750 mg by mouth 2 (two) times daily with meals.     multivitamin per tablet  Commonly known as:  THERAGRAN  Take 1 tablet by mouth once daily.     VITAMIN B-12 1000 MCG tablet  Generic drug:  cyanocobalamin  Take 100 mcg by mouth once daily.        STOP taking these medications    carvedilol 25 MG tablet  Commonly known as:  COREG            Indwelling Lines/Drains at time of discharge:   Lines/Drains/Airways          None          Time spent on the discharge of patient: 45 minutes  Patient was seen and examined on the date of discharge and determined to be suitable for discharge.         Katya Beckett MD  Department of Hospital Medicine  [facility]

## 2019-09-11 ENCOUNTER — TELEPHONE (OUTPATIENT)
Dept: CARDIOLOGY | Facility: CLINIC | Age: 59
End: 2019-09-11

## 2019-09-11 NOTE — PROGRESS NOTES
Pharmacy New Medication Education    Patient and/or Caregiver ACCEPTED medication education.    Pharmacy has provided education on the name, indication, and possible side effects of the medication(s) prescribed, using teach-back method.     Learners of pharmacy medication education includes:  patient    Medication Indication Side Effects   amlodipine HTN dizziness and headache   benazepril  HTN dizziness and headache          The following medications have also been discussed, during this admission.     No current facility-administered medications for this encounter.        Current Outpatient Medications   Medication Sig    amlodipine-benazepril 5-20 mg (LOTREL) 5-20 mg per capsule TAKE 1 CAPSULE BY MOUTH EVERY DAY    aspirin (ECOTRIN) 81 MG EC tablet Take 81 mg by mouth once daily.    atorvastatin (LIPITOR) 40 MG tablet Take 40 mg by mouth every evening.    cilostazol (PLETAL) 50 MG Tab TAKE 1 TABLET (50 MG TOTAL) BY MOUTH 2 (TWO) TIMES DAILY.    clopidogrel (PLAVIX) 75 mg tablet TAKE 1 TABLET (75 MG TOTAL) BY MOUTH ONCE DAILY.    cyanocobalamin (VITAMIN B-12) 1000 MCG tablet Take 100 mcg by mouth once daily.    ferrous sulfate 325 mg (65 mg iron) Tab tablet Take 325 mg by mouth daily with breakfast.    fish oil-omega-3 fatty acids 300-1,000 mg capsule Take 1 capsule by mouth once daily.    metFORMIN (GLUCOPHAGE) 500 MG tablet Take 750 mg by mouth 2 (two) times daily with meals.     multivitamin (THERAGRAN) per tablet Take 1 tablet by mouth once daily.          Thank you  Xiang Grayson, PharmD

## 2019-09-24 ENCOUNTER — HOSPITAL ENCOUNTER (OUTPATIENT)
Dept: CARDIOLOGY | Facility: HOSPITAL | Age: 59
Discharge: HOME | End: 2019-09-24
Attending: NURSE PRACTITIONER
Payer: COMMERCIAL

## 2019-09-24 DIAGNOSIS — I44.1 AV BLOCK, MOBITZ 1: ICD-10-CM

## 2019-09-24 PROCEDURE — 93227 XTRNL ECG REC<48 HR R&I: CPT | Mod: ,,, | Performed by: INTERNAL MEDICINE

## 2019-09-24 PROCEDURE — 93227 HOLTER MONITOR - 48 HOUR (CUPID ONLY): ICD-10-PCS | Mod: ,,, | Performed by: INTERNAL MEDICINE

## 2019-09-24 PROCEDURE — 93225 XTRNL ECG REC<48 HRS REC: CPT | Mod: PO

## 2019-09-30 LAB
OHS CV EVENT MONITOR DAY: 0
OHS CV HOLTER LENGTH DECIMAL HOURS: 47.98
OHS CV HOLTER LENGTH HOURS: 47
OHS CV HOLTER LENGTH MINUTES: 59

## 2019-10-03 ENCOUNTER — OFFICE VISIT (OUTPATIENT)
Dept: CARDIOLOGY | Facility: CLINIC | Age: 59
End: 2019-10-03
Payer: COMMERCIAL

## 2019-10-03 ENCOUNTER — TELEPHONE (OUTPATIENT)
Dept: CARDIOLOGY | Facility: CLINIC | Age: 59
End: 2019-10-03

## 2019-10-03 VITALS
BODY MASS INDEX: 29.34 KG/M2 | WEIGHT: 198.06 LBS | HEIGHT: 69 IN | SYSTOLIC BLOOD PRESSURE: 140 MMHG | DIASTOLIC BLOOD PRESSURE: 68 MMHG | HEART RATE: 83 BPM

## 2019-10-03 DIAGNOSIS — I10 ESSENTIAL HYPERTENSION: ICD-10-CM

## 2019-10-03 DIAGNOSIS — I73.9 PAD (PERIPHERAL ARTERY DISEASE): Primary | ICD-10-CM

## 2019-10-03 DIAGNOSIS — I70.213 ATHEROSCLEROSIS OF NATIVE ARTERY OF BOTH LOWER EXTREMITIES WITH INTERMITTENT CLAUDICATION: ICD-10-CM

## 2019-10-03 DIAGNOSIS — I44.1 AV BLOCK, MOBITZ 1: ICD-10-CM

## 2019-10-03 DIAGNOSIS — E11.9 TYPE 2 DIABETES MELLITUS WITHOUT COMPLICATION, WITHOUT LONG-TERM CURRENT USE OF INSULIN: ICD-10-CM

## 2019-10-03 DIAGNOSIS — E78.5 HYPERLIPIDEMIA ASSOCIATED WITH TYPE 2 DIABETES MELLITUS: ICD-10-CM

## 2019-10-03 DIAGNOSIS — I44.1 2ND DEGREE AV BLOCK: ICD-10-CM

## 2019-10-03 DIAGNOSIS — E11.69 HYPERLIPIDEMIA ASSOCIATED WITH TYPE 2 DIABETES MELLITUS: ICD-10-CM

## 2019-10-03 DIAGNOSIS — R09.89 BILATERAL CAROTID BRUITS: ICD-10-CM

## 2019-10-03 DIAGNOSIS — I25.10 CORONARY ARTERY DISEASE INVOLVING NATIVE CORONARY ARTERY OF NATIVE HEART WITHOUT ANGINA PECTORIS: ICD-10-CM

## 2019-10-03 PROCEDURE — 3078F DIAST BP <80 MM HG: CPT | Mod: CPTII,S$GLB,, | Performed by: INTERNAL MEDICINE

## 2019-10-03 PROCEDURE — 99999 PR PBB SHADOW E&M-EST. PATIENT-LVL III: CPT | Mod: PBBFAC,,, | Performed by: INTERNAL MEDICINE

## 2019-10-03 PROCEDURE — 3045F PR MOST RECENT HEMOGLOBIN A1C LEVEL 7.0-9.0%: CPT | Mod: CPTII,S$GLB,, | Performed by: INTERNAL MEDICINE

## 2019-10-03 PROCEDURE — 3077F SYST BP >= 140 MM HG: CPT | Mod: CPTII,S$GLB,, | Performed by: INTERNAL MEDICINE

## 2019-10-03 PROCEDURE — 3078F PR MOST RECENT DIASTOLIC BLOOD PRESSURE < 80 MM HG: ICD-10-PCS | Mod: CPTII,S$GLB,, | Performed by: INTERNAL MEDICINE

## 2019-10-03 PROCEDURE — 3008F BODY MASS INDEX DOCD: CPT | Mod: CPTII,S$GLB,, | Performed by: INTERNAL MEDICINE

## 2019-10-03 PROCEDURE — 99215 OFFICE O/P EST HI 40 MIN: CPT | Mod: S$GLB,,, | Performed by: INTERNAL MEDICINE

## 2019-10-03 PROCEDURE — 3045F PR MOST RECENT HEMOGLOBIN A1C LEVEL 7.0-9.0%: ICD-10-PCS | Mod: CPTII,S$GLB,, | Performed by: INTERNAL MEDICINE

## 2019-10-03 PROCEDURE — 3008F PR BODY MASS INDEX (BMI) DOCUMENTED: ICD-10-PCS | Mod: CPTII,S$GLB,, | Performed by: INTERNAL MEDICINE

## 2019-10-03 PROCEDURE — 99215 PR OFFICE/OUTPT VISIT, EST, LEVL V, 40-54 MIN: ICD-10-PCS | Mod: S$GLB,,, | Performed by: INTERNAL MEDICINE

## 2019-10-03 PROCEDURE — 3077F PR MOST RECENT SYSTOLIC BLOOD PRESSURE >= 140 MM HG: ICD-10-PCS | Mod: CPTII,S$GLB,, | Performed by: INTERNAL MEDICINE

## 2019-10-03 PROCEDURE — 99999 PR PBB SHADOW E&M-EST. PATIENT-LVL III: ICD-10-PCS | Mod: PBBFAC,,, | Performed by: INTERNAL MEDICINE

## 2019-10-03 RX ORDER — CILOSTAZOL 100 MG/1
100 TABLET ORAL 2 TIMES DAILY
Qty: 180 TABLET | Refills: 3 | Status: SHIPPED | OUTPATIENT
Start: 2019-10-03 | End: 2020-10-28

## 2019-10-03 NOTE — LETTER
October 3, 2019      Sabino Ruiz MD  [address]  [address]  [facility] [address]           [place] [facility]  [address] [place] [address] [address]  [facility] [address]  Phone: [phone]          Patient: Shimon Ch Jr.   MR Number: [id number]   YOB: 1960   Date of Visit: 10/3/2019       Dear Dr. Sabino Ruiz:    Thank you for referring Shimon Ch to me for evaluation. Attached you will find relevant portions of my assessment and plan of care.    If you have questions, please do not hesitate to call me. I look forward to following Shimon Ch along with you.    Sincerely,    Grey Angela MD    Enclosure  CC:  No Recipients    If you would like to receive this communication electronically, please contact [email or web address] or [phone] to request more information on [facility] [employer] access.    For providers and/or their staff who would like to refer a patient to [facility], please contact us through our one-stop-shop provider referral line, [facility], at [phone].    If you feel you have received this communication in error or would no longer like to receive these types of communications, please e-mail [email or web address]

## 2019-10-03 NOTE — TELEPHONE ENCOUNTER
----- Message from Grey Angela MD sent at 10/3/2019  8:54 AM CDT -----      Please order tests      [facility]      Thanks      ZN

## 2019-10-03 NOTE — PATIENT INSTRUCTIONS

## 2019-10-03 NOTE — PROGRESS NOTES
Subjective:   Patient ID:  Shimon Ch Jr. is a 59 y.o. male who presents for evaluation and treatment of Claudication; Peripheral Arterial Disease; Coronary Artery Disease; Conduction disease; Hyperlipidemia; and Hypertension      HPI:     He is here for follow up after an admission for bradycardia with intermittent Mobitz I and II. He was taking coreg 25 mg po bid. His HR and Sinus rhythm recovered after stopping coreg. Outpatient holter was overall unremarkable. He recalls one episode of CP at rest but never with exertion. He is complaining of R lateral calf len IIb claudication. He has known PAD s/p bilateral SFA revascularization in 2018. He also has a h/o single vessel PCI in 2008 (unknown vessel). No angina. No TIA/CVA. He takes aspirin and plavix for DAPT. He takes statin and acei. He takes pletal 50 mg po bid.       ECG 9/8/2019: NSR with 2nd AVB with 2:1 with VR 37  ECG 9/10/2019: NSR with 1 AVB      Arterial ultrasound done at [place] 9/2017   No films available for review      Report states:     Bilateral CFA disease with monophasic bilateral SFA velocities    Bilateral PT high grade disease      SUSANNE 4/2018   R 0.61   L 0.76      Carotid US 1/018   R ICA PSV 90 cm/sec   L ICA  cm/sec    Echo 1/2018   Normal EF   Aortic sclerosis with MG 10 mmHg   PASP 14 mmHg      Peripheral intervention 6/2018     PTA of L POP with 6.0 x 80 mm Lutonix DCB   PTA of L SFA with 6.0 x 150 + 6.0 x 120 mm Lutonix DCB        Peripheral intervention 7/20/2018       Crossed R SFA with antegrade wire manipulation   Atherectomy with 2.0 Classic CSI   PTA of distal SFA and proximal SFA with 6.0 x 150 mm Lutonix DCB   PTAS of mid SFA ISR  with 7.0 x 100 mm Zilver PTX          Echo 9/2019     EF 65%   Grade II diastolic dysfunction   Mild AS with MG 19 mmHg   PASP 21 mmHg   Normal RV function              Care team:     Dr. JULISSA Ruiz (PCP)        Patient Active Problem List    Diagnosis Date Noted     Atherosclerosis of native artery of both lower extremities with intermittent claudication 11/15/2017     Priority: Low    2nd degree AV block 09/10/2019    AV block, Mobitz 1 09/09/2019    Chest pain 09/08/2019    Bradycardia 09/08/2019    WANG (acute kidney injury) 09/08/2019    Pseudoaneurysm 08/09/2018    Coronary artery disease involving native coronary artery of native heart without angina pectoris 11/15/2017         S/P PCI in 2008  Unknown vessel  Klahr      Essential hypertension 11/15/2017    Type 2 diabetes mellitus without complication, without long-term current use of insulin 11/15/2017    Hyperlipidemia associated with type 2 diabetes mellitus 11/15/2017    PAD (peripheral artery disease) 11/15/2017         S/p R EIA + mid SFA PTAS in 2008  Klahr      Diameters   CFA 8 mm   SFA 7 mm   POP 6 mm   BTK 3/3.5 mm      SUSANNE 4/2018   R 0.61   L 0.76      Arterial ultrasound done at [place] 9/2017                                          Report states:                            Bilateral CFA disease with monophasic bilateral SFA velocities                          Bilateral PT high grade disease        US 6/2018 [facility]         R CFA pSFA mSFA dSFA pPOP dPOP PT AT DP  L CFA pSFA mSFA dSFA pPOP dPOP PT AT DP        Peripheral intervention 6/29/2018       Aorta + bilateral renal arteries are patent   Bilateral ARIEL, EIA, and CFA are patent   Ostial R SFA + mid SFA  with distal reconstitution from DFA   3 vessel run off on R foot          Prox, mid, and distal L SFA  with 95% L POP stenosis   2 vessel run off via AT + PER       Intact plantar arches bilaterally        PTA of L POP with 6.0 x 80 mm Lutonix DCB   PTA of L SFA with 6.0 x 150 + 6.0 x 120 mm Lutonix DCB        Peripheral intervention 7/20/2018       Crossed R SFA with antegrade wire manipulation   Atherectomy with 2.0 Classic CSI   PTA of distal SFA and proximal SFA with 6.0 x 150 mm Lutonix DCB   PTAS of mid SFA ISR   with 7.0 x 100 mm Zilver PTX                               Bilateral carotid bruits 11/15/2017    Cardiac murmur 11/15/2017           Right Arm BP - Sittin/68  Left Arm BP - Sittin/68            Review of Systems   Constitution: Negative for diaphoresis, night sweats, weight gain and weight loss.   HENT: Negative for congestion.    Eyes: Negative for blurred vision, discharge and double vision.   Cardiovascular: Positive for claudication. Negative for chest pain, cyanosis, dyspnea on exertion, irregular heartbeat, leg swelling, near-syncope, orthopnea, palpitations, paroxysmal nocturnal dyspnea and syncope.   Respiratory: Negative for cough, shortness of breath and wheezing.    Endocrine: Negative for cold intolerance, heat intolerance and polyphagia.   Hematologic/Lymphatic: Negative for adenopathy and bleeding problem. Does not bruise/bleed easily.   Skin: Negative for dry skin and nail changes.   Musculoskeletal: Negative for arthritis, back pain, falls, joint pain, myalgias and neck pain.   Gastrointestinal: Negative for bloating, abdominal pain, change in bowel habit and constipation.   Genitourinary: Negative for bladder incontinence, dysuria, flank pain, genital sores and missed menses.   Neurological: Negative for aphonia, brief paralysis, difficulty with concentration, dizziness and weakness.   Psychiatric/Behavioral: Negative for altered mental status and memory loss. The patient does not have insomnia.    Allergic/Immunologic: Negative for environmental allergies.       Objective:   Physical Exam   Constitutional: He is oriented to person, place, and time. He appears well-developed and well-nourished. He is not intubated.   HENT:   Head: Normocephalic and atraumatic.   Right Ear: External ear normal.   Left Ear: External ear normal.   Mouth/Throat: Oropharynx is clear and moist.   Eyes: Pupils are equal, round, and reactive to light. Conjunctivae and EOM are normal. Right eye exhibits no  discharge. Left eye exhibits no discharge. No scleral icterus.   Neck: Normal range of motion. Neck supple. Normal carotid pulses, no hepatojugular reflux and no JVD present. Carotid bruit is not present. No tracheal deviation present. No thyromegaly present.   Cardiovascular: Normal rate, regular rhythm, S1 normal and S2 normal.  No extrasystoles are present. PMI is not displaced. Exam reveals no gallop, no S3, no distant heart sounds, no friction rub and no midsystolic click.   Murmur heard.   Systolic murmur is present with a grade of 2/6 at the upper right sternal border radiating to the neck.  Pulses:       Carotid pulses are 2+ on the right side with bruit, and 2+ on the left side with bruit.       Radial pulses are 2+ on the right side, and 2+ on the left side.        Femoral pulses are 2+ on the right side, and 2+ on the left side.       Popliteal pulses are 2+ on the right side, and 2+ on the left side.        Dorsalis pedis pulses are 0 on the right side, and 0 on the left side.        Posterior tibial pulses are 0 on the right side, and 0 on the left side.       Biphasic R PT and monophasic R DP doppler signals     Biphasic L DP and faint L PT doppler signals          Pulmonary/Chest: Effort normal and breath sounds normal. No accessory muscle usage or stridor. No apnea, no tachypnea and no bradypnea. He is not intubated. No respiratory distress. He has no decreased breath sounds. He has no wheezes. He has no rales. He exhibits no tenderness and no bony tenderness.   Abdominal: He exhibits no distension, no pulsatile liver, no abdominal bruit, no ascites, no pulsatile midline mass and no mass. There is no tenderness. There is no rebound and no guarding.   Musculoskeletal: Normal range of motion. He exhibits no edema or tenderness.   Lymphadenopathy:     He has no cervical adenopathy.   Neurological: He is alert and oriented to person, place, and time. He has normal reflexes. No cranial nerve deficit.  Coordination normal.   Skin: Skin is warm. No rash noted. No erythema. No pallor.       No ulcers       Psychiatric: He has a normal mood and affect. His behavior is normal. Judgment and thought content normal.       Assessment:     1. PAD (peripheral artery disease)    2. Atherosclerosis of native artery of both lower extremities with intermittent claudication    3. Coronary artery disease involving native coronary artery of native heart without angina pectoris    4. Essential hypertension    5. Hyperlipidemia associated with type 2 diabetes mellitus    6. Bilateral carotid bruits    7. AV block, Mobitz 1    8. 2nd degree AV block    9. Type 2 diabetes mellitus without complication, without long-term current use of insulin        Plan:             Titrate to 100 mg bid  Continue with walking program  Continue with aspirin + plavix for DAPT  Obtain SUSANNE + US   Adjust HTN agents to target BP < 130/80 mmHg        Weight loss  Exercise  Low salt and carbohydrate diet  Bring BP log during next visit   Will enroll in digital HTN clinic       Continue with current medical plan and lifestyle changes.  Return sooner for concerns or questions. If symptoms persist go to the ED  I have reviewed all pertinent data on this patient       I have reviewed the patient's medical history in detail and updated the computerized patient record.    Orders Placed This Encounter   Procedures    US Lower Extremity Arteries Bilateral     Standing Status:   Future     Standing Expiration Date:   10/3/2020    CV Exercise SUSANNE     Standing Status:   Future     Standing Expiration Date:   10/3/2020       Follow up as scheduled. Return sooner for concerns or questions            He expressed verbal understanding and agreed with the plan      Greater than 50% of the visit of 45 minutes was spent counseling, educating, and coordinating the care of the patient.  -In today's visit, at least 4 established conditions that pose a risk to life or bodily  function have been addressed and the conditions are severe.    -In today's visit, monitoring for drug toxicity was accomplished.          Patient's Medications   New Prescriptions    No medications on file   Previous Medications    AMLODIPINE-BENAZEPRIL 5-20 MG (LOTREL) 5-20 MG PER CAPSULE    TAKE 1 CAPSULE BY MOUTH EVERY DAY    ASPIRIN (ECOTRIN) 81 MG EC TABLET    Take 81 mg by mouth once daily.    ATORVASTATIN (LIPITOR) 40 MG TABLET    Take 40 mg by mouth every evening.    CLOPIDOGREL (PLAVIX) 75 MG TABLET    TAKE 1 TABLET (75 MG TOTAL) BY MOUTH ONCE DAILY.    CYANOCOBALAMIN (VITAMIN B-12) 1000 MCG TABLET    Take 100 mcg by mouth once daily.    FERROUS SULFATE 325 MG (65 MG IRON) TAB TABLET    Take 325 mg by mouth daily with breakfast.    FISH OIL-OMEGA-3 FATTY ACIDS 300-1,000 MG CAPSULE    Take 1 capsule by mouth once daily.    METFORMIN (GLUCOPHAGE) 500 MG TABLET    Take 750 mg by mouth 2 (two) times daily with meals.     MULTIVITAMIN (THERAGRAN) PER TABLET    Take 1 tablet by mouth once daily.   Modified Medications    Modified Medication Previous Medication    CILOSTAZOL (PLETAL) 100 MG TAB cilostazol (PLETAL) 50 MG Tab       Take 1 tablet (100 mg total) by mouth 2 (two) times daily.    TAKE 1 TABLET (50 MG TOTAL) BY MOUTH 2 (TWO) TIMES DAILY.   Discontinued Medications    No medications on file

## 2019-10-14 ENCOUNTER — HOSPITAL ENCOUNTER (OUTPATIENT)
Dept: CARDIOLOGY | Facility: HOSPITAL | Age: 59
Discharge: HOME | End: 2019-10-14
Attending: INTERNAL MEDICINE
Payer: COMMERCIAL

## 2019-10-14 ENCOUNTER — HOSPITAL ENCOUNTER (OUTPATIENT)
Dept: RADIOLOGY | Facility: HOSPITAL | Age: 59
Discharge: HOME | End: 2019-10-14
Attending: INTERNAL MEDICINE
Payer: COMMERCIAL

## 2019-10-14 DIAGNOSIS — I73.9 PAD (PERIPHERAL ARTERY DISEASE): ICD-10-CM

## 2019-10-14 PROCEDURE — 93925 LOWER EXTREMITY STUDY: CPT | Mod: TC,PO

## 2019-10-14 PROCEDURE — 93922 UPR/L XTREMITY ART 2 LEVELS: CPT | Mod: 50,PO

## 2019-10-14 PROCEDURE — 93922 CV US ANKLE BRACHIAL INDICES RESTING (CUPID ONLY): ICD-10-PCS | Mod: 26,,, | Performed by: INTERNAL MEDICINE

## 2019-10-14 PROCEDURE — 93922 UPR/L XTREMITY ART 2 LEVELS: CPT | Mod: 26,,, | Performed by: INTERNAL MEDICINE

## 2019-10-22 LAB
LEFT ABI: 1.81
LEFT ARM BP: 138 MMHG
LEFT DORSALIS PEDIS: 250 MMHG
LEFT POSTERIOR TIBIAL: 250 MMHG
RIGHT ABI: 1.81
RIGHT ARM BP: 138 MMHG
RIGHT DORSALIS PEDIS: 250 MMHG
RIGHT POSTERIOR TIBIAL: 250 MMHG

## 2019-11-20 ENCOUNTER — OFFICE VISIT (OUTPATIENT)
Dept: CARDIOLOGY | Facility: CLINIC | Age: 59
End: 2019-11-20
Payer: COMMERCIAL

## 2019-11-20 ENCOUNTER — TELEPHONE (OUTPATIENT)
Dept: CARDIOLOGY | Facility: CLINIC | Age: 59
End: 2019-11-20

## 2019-11-20 VITALS
DIASTOLIC BLOOD PRESSURE: 77 MMHG | WEIGHT: 196 LBS | HEART RATE: 85 BPM | SYSTOLIC BLOOD PRESSURE: 144 MMHG | HEIGHT: 69 IN | BODY MASS INDEX: 29.03 KG/M2

## 2019-11-20 DIAGNOSIS — R01.1 CARDIAC MURMUR: ICD-10-CM

## 2019-11-20 DIAGNOSIS — N52.8 OTHER MALE ERECTILE DYSFUNCTION: ICD-10-CM

## 2019-11-20 DIAGNOSIS — E11.69 HYPERLIPIDEMIA ASSOCIATED WITH TYPE 2 DIABETES MELLITUS: ICD-10-CM

## 2019-11-20 DIAGNOSIS — I25.10 CORONARY ARTERY DISEASE INVOLVING NATIVE CORONARY ARTERY OF NATIVE HEART WITHOUT ANGINA PECTORIS: Primary | ICD-10-CM

## 2019-11-20 DIAGNOSIS — E78.5 HYPERLIPIDEMIA ASSOCIATED WITH TYPE 2 DIABETES MELLITUS: ICD-10-CM

## 2019-11-20 DIAGNOSIS — R00.1 BRADYCARDIA: ICD-10-CM

## 2019-11-20 DIAGNOSIS — I73.9 PAD (PERIPHERAL ARTERY DISEASE): ICD-10-CM

## 2019-11-20 DIAGNOSIS — E11.9 TYPE 2 DIABETES MELLITUS WITHOUT COMPLICATION, WITHOUT LONG-TERM CURRENT USE OF INSULIN: ICD-10-CM

## 2019-11-20 DIAGNOSIS — I10 ESSENTIAL HYPERTENSION: ICD-10-CM

## 2019-11-20 DIAGNOSIS — I44.1 AV BLOCK, MOBITZ 1: ICD-10-CM

## 2019-11-20 DIAGNOSIS — I70.213 ATHEROSCLEROSIS OF NATIVE ARTERY OF BOTH LOWER EXTREMITIES WITH INTERMITTENT CLAUDICATION: ICD-10-CM

## 2019-11-20 PROCEDURE — 3078F PR MOST RECENT DIASTOLIC BLOOD PRESSURE < 80 MM HG: ICD-10-PCS | Mod: CPTII,S$GLB,, | Performed by: INTERNAL MEDICINE

## 2019-11-20 PROCEDURE — 99215 PR OFFICE/OUTPT VISIT, EST, LEVL V, 40-54 MIN: ICD-10-PCS | Mod: S$GLB,,, | Performed by: INTERNAL MEDICINE

## 2019-11-20 PROCEDURE — 3008F BODY MASS INDEX DOCD: CPT | Mod: CPTII,S$GLB,, | Performed by: INTERNAL MEDICINE

## 2019-11-20 PROCEDURE — 99999 PR PBB SHADOW E&M-EST. PATIENT-LVL III: CPT | Mod: PBBFAC,,, | Performed by: INTERNAL MEDICINE

## 2019-11-20 PROCEDURE — 3077F SYST BP >= 140 MM HG: CPT | Mod: CPTII,S$GLB,, | Performed by: INTERNAL MEDICINE

## 2019-11-20 PROCEDURE — 99999 PR PBB SHADOW E&M-EST. PATIENT-LVL III: ICD-10-PCS | Mod: PBBFAC,,, | Performed by: INTERNAL MEDICINE

## 2019-11-20 PROCEDURE — 99215 OFFICE O/P EST HI 40 MIN: CPT | Mod: S$GLB,,, | Performed by: INTERNAL MEDICINE

## 2019-11-20 PROCEDURE — 3077F PR MOST RECENT SYSTOLIC BLOOD PRESSURE >= 140 MM HG: ICD-10-PCS | Mod: CPTII,S$GLB,, | Performed by: INTERNAL MEDICINE

## 2019-11-20 PROCEDURE — 3008F PR BODY MASS INDEX (BMI) DOCUMENTED: ICD-10-PCS | Mod: CPTII,S$GLB,, | Performed by: INTERNAL MEDICINE

## 2019-11-20 PROCEDURE — 3078F DIAST BP <80 MM HG: CPT | Mod: CPTII,S$GLB,, | Performed by: INTERNAL MEDICINE

## 2019-11-20 RX ORDER — AMLODIPINE AND BENAZEPRIL HYDROCHLORIDE 10; 20 MG/1; MG/1
1 CAPSULE ORAL DAILY
Qty: 90 CAPSULE | Refills: 3 | Status: SHIPPED | OUTPATIENT
Start: 2019-11-20 | End: 2020-11-10

## 2019-11-20 NOTE — PROGRESS NOTES
Subjective:   Patient ID:  Shimon Ch Jr. is a 59 y.o. male who presents for evaluation and treatment of Hyperlipidemia; Hypertension; Peripheral Arterial Disease; Coronary Artery Disease; and conduction disease      HPI:     He is here for follow up after an admission for bradycardia with intermittent Mobitz I and II. He was taking coreg 25 mg po bid. His HR and Sinus rhythm recovered after stopping coreg. Outpatient holter was overall unremarkable. He recalls one episode of CP at rest but never with exertion. He was complaining of R lateral calf len IIb claudication which drastically improved after increasing pletal to 100 mg bid      . He has known PAD s/p bilateral SFA revascularization in 2018. He also has a h/o single vessel PCI in 2008 (unknown vessel). No angina. No TIA/CVA. He takes aspirin and plavix for DAPT. He takes statin and acei.        ECG 9/8/2019: NSR with 2nd AVB with 2:1 with VR 37  ECG 9/10/2019: NSR with 1 AVB      Arterial ultrasound done at [place] 9/2017   No films available for review      Report states:     Bilateral CFA disease with monophasic bilateral SFA velocities    Bilateral PT high grade disease      SUSANNE 4/2018   R 0.61   L 0.76      Carotid US 1/018   R ICA PSV 90 cm/sec   L ICA  cm/sec    Echo 1/2018   Normal EF   Aortic sclerosis with MG 10 mmHg   PASP 14 mmHg      Peripheral intervention 6/2018     PTA of L POP with 6.0 x 80 mm Lutonix DCB   PTA of L SFA with 6.0 x 150 + 6.0 x 120 mm Lutonix DCB        Peripheral intervention 7/20/2018       Crossed R SFA with antegrade wire manipulation   Atherectomy with 2.0 Classic CSI   PTA of distal SFA and proximal SFA with 6.0 x 150 mm Lutonix DCB   PTAS of mid SFA ISR  with 7.0 x 100 mm Zilver PTX          Echo 9/2019     EF 65%   Grade II diastolic dysfunction   Mild AS with MG 19 mmHg   PASP 21 mmHg   Normal RV function        Holter 9/2019      · NSR with HR varying between 47 and 128 bpm with an average of  85 bpm.  · There were very rare PVCs totalling 1 and averaging 0.02 per hour.  · There were very rare PACs totalling 65 and averaging 1.35 per hour.  · The longest RR interval was 2000 msec.      SUSANNE 10/2019     Non compressible vessels       US 10/2019       Patent R CFA   R SFA  with distal reconstitution   Patent R AT + DP   High grade L SFA disease    Patent L AT + DP        Care team:     Dr. JULISSA Ruiz (PCP)        Patient Active Problem List    Diagnosis Date Noted    Atherosclerosis of native artery of both lower extremities with intermittent claudication 11/15/2017     Priority: Low    Other male erectile dysfunction 11/20/2019    2nd degree AV block 09/10/2019    AV block, Mobitz 1 09/09/2019    Chest pain 09/08/2019    Bradycardia 09/08/2019    WANG (acute kidney injury) 09/08/2019    Pseudoaneurysm 08/09/2018    Coronary artery disease involving native coronary artery of native heart without angina pectoris 11/15/2017         S/P PCI in 2008  Unknown vessel  [place]      Essential hypertension 11/15/2017    Type 2 diabetes mellitus without complication, without long-term current use of insulin 11/15/2017    Hyperlipidemia associated with type 2 diabetes mellitus 11/15/2017    PAD (peripheral artery disease) 11/15/2017         S/p R EIA + mid SFA PTAS in 2008  [place]      Diameters   CFA 8 mm   SFA 7 mm   POP 6 mm   BTK 3/3.5 mm      SUSANNE 4/2018   R 0.61   L 0.76      Arterial ultrasound done at [place] 9/2017                                          Report states:                            Bilateral CFA disease with monophasic bilateral SFA velocities                          Bilateral PT high grade disease        US 6/2018 [facility]         R CFA pSFA mSFA dSFA pPOP dPOP PT AT DP  L CFA pSFA mSFA dSFA pPOP dPOP PT AT DP        Peripheral intervention 6/29/2018       Aorta + bilateral renal arteries are patent   Bilateral ARIEL, EIA, and CFA are patent   Ostial R SFA + mid SFA   with distal reconstitution from DFA   3 vessel run off on R foot          Prox, mid, and distal L SFA  with 95% L POP stenosis   2 vessel run off via AT + PER       Intact plantar arches bilaterally        PTA of L POP with 6.0 x 80 mm Lutonix DCB   PTA of L SFA with 6.0 x 150 + 6.0 x 120 mm Lutonix DCB        Peripheral intervention 2018       Crossed R SFA with antegrade wire manipulation   Atherectomy with 2.0 Classic CSI   PTA of distal SFA and proximal SFA with 6.0 x 150 mm Lutonix DCB   PTAS of mid SFA ISR  with 7.0 x 100 mm Zilver PTX                               Bilateral carotid bruits 11/15/2017    Cardiac murmur 11/15/2017           Right Arm BP - Sittin/77  Left Arm BP - Sittin/77            Review of Systems   Constitution: Negative for diaphoresis, night sweats, weight gain and weight loss.   HENT: Negative for congestion.    Eyes: Negative for blurred vision, discharge and double vision.   Cardiovascular: Positive for claudication. Negative for chest pain, cyanosis, dyspnea on exertion, irregular heartbeat, leg swelling, near-syncope, orthopnea, palpitations, paroxysmal nocturnal dyspnea and syncope.   Respiratory: Negative for cough, shortness of breath and wheezing.    Endocrine: Negative for cold intolerance, heat intolerance and polyphagia.   Hematologic/Lymphatic: Negative for adenopathy and bleeding problem. Does not bruise/bleed easily.   Skin: Negative for dry skin and nail changes.   Musculoskeletal: Negative for arthritis, back pain, falls, joint pain, myalgias and neck pain.   Gastrointestinal: Negative for bloating, abdominal pain, change in bowel habit and constipation.   Genitourinary: Negative for bladder incontinence, dysuria, flank pain, genital sores and missed menses.   Neurological: Negative for aphonia, brief paralysis, difficulty with concentration, dizziness and weakness.   Psychiatric/Behavioral: Negative for altered mental status and memory loss. The  patient does not have insomnia.    Allergic/Immunologic: Negative for environmental allergies.       Objective:   Physical Exam   Constitutional: He is oriented to person, place, and time. He appears well-developed and well-nourished. He is not intubated.   HENT:   Head: Normocephalic and atraumatic.   Right Ear: External ear normal.   Left Ear: External ear normal.   Mouth/Throat: Oropharynx is clear and moist.   Eyes: Pupils are equal, round, and reactive to light. Conjunctivae and EOM are normal. Right eye exhibits no discharge. Left eye exhibits no discharge. No scleral icterus.   Neck: Normal range of motion. Neck supple. Normal carotid pulses, no hepatojugular reflux and no JVD present. Carotid bruit is not present. No tracheal deviation present. No thyromegaly present.   Cardiovascular: Normal rate, regular rhythm, S1 normal and S2 normal.  No extrasystoles are present. PMI is not displaced. Exam reveals no gallop, no S3, no distant heart sounds, no friction rub and no midsystolic click.   Murmur heard.   Systolic murmur is present with a grade of 2/6 at the upper right sternal border radiating to the neck.  Pulses:       Carotid pulses are 2+ on the right side with bruit, and 2+ on the left side with bruit.       Radial pulses are 2+ on the right side, and 2+ on the left side.        Femoral pulses are 2+ on the right side, and 2+ on the left side.       Popliteal pulses are 2+ on the right side, and 2+ on the left side.        Dorsalis pedis pulses are 0 on the right side, and 0 on the left side.        Posterior tibial pulses are 0 on the right side, and 0 on the left side.       Biphasic R PT and monophasic R DP doppler signals     Biphasic L DP and faint L PT doppler signals          Pulmonary/Chest: Effort normal and breath sounds normal. No accessory muscle usage or stridor. No apnea, no tachypnea and no bradypnea. He is not intubated. No respiratory distress. He has no decreased breath sounds. He has  no wheezes. He has no rales. He exhibits no tenderness and no bony tenderness.   Abdominal: He exhibits no distension, no pulsatile liver, no abdominal bruit, no ascites, no pulsatile midline mass and no mass. There is no tenderness. There is no rebound and no guarding.   Musculoskeletal: Normal range of motion. He exhibits no edema or tenderness.   Lymphadenopathy:     He has no cervical adenopathy.   Neurological: He is alert and oriented to person, place, and time. He has normal reflexes. No cranial nerve deficit. Coordination normal.   Skin: Skin is warm. No rash noted. No erythema. No pallor.       No ulcers       Psychiatric: He has a normal mood and affect. His behavior is normal. Judgment and thought content normal.       Assessment:     1. Coronary artery disease involving native coronary artery of native heart without angina pectoris    2. Atherosclerosis of native artery of both lower extremities with intermittent claudication    3. Essential hypertension    4. Hyperlipidemia associated with type 2 diabetes mellitus    5. PAD (peripheral artery disease)    6. Cardiac murmur    7. AV block, Mobitz 1    8. Bradycardia    9. Type 2 diabetes mellitus without complication, without long-term current use of insulin    10. Other male erectile dysfunction        Plan:               Continue with walking program  Continue with aspirin + plavix for DAPT  Obtain SUSANNE + US   Increase benazepril to 10/20 mg daily   Target BP < 130/80 mmHg  He can take Viagra for ED        Weight loss  Exercise  Low salt and carbohydrate diet  Bring BP log during next visit   Will enroll in digital HTN clinic         Follow up in a year with echo + lower ext ultrasound         Continue with current medical plan and lifestyle changes.  Return sooner for concerns or questions. If symptoms persist go to the ED  I have reviewed all pertinent data on this patient       I have reviewed the patient's medical history in detail and updated the  computerized patient record.    Orders Placed This Encounter   Procedures    US Lower Extremity Arteries Bilateral     Standing Status:   Future     Standing Expiration Date:   11/20/2020    Lipid panel     Standing Status:   Future     Standing Expiration Date:   1/18/2021    Comprehensive metabolic panel     Standing Status:   Future     Standing Expiration Date:   1/18/2021    Ambulatory Referral to Podiatry     Referral Priority:   Routine     Referral Type:   Consultation     Referral Reason:   Specialty Services Required     Requested Specialty:   Podiatry     Number of Visits Requested:   1    Echo Color Flow Doppler? Yes     Standing Status:   Future     Standing Expiration Date:   11/20/2020     Order Specific Question:   Color Flow Doppler?     Answer:   Yes       Follow up as scheduled. Return sooner for concerns or questions            He expressed verbal understanding and agreed with the plan      Greater than 50% of the visit of 45 minutes was spent counseling, educating, and coordinating the care of the patient.  -In today's visit, at least 4 established conditions that pose a risk to life or bodily function have been addressed and the conditions are severe.    -In today's visit, monitoring for drug toxicity was accomplished.          Patient's Medications   New Prescriptions    AMLODIPINE-BENAZEPRIL 10-20MG (LOTREL) 10-20 MG PER CAPSULE    Take 1 capsule by mouth once daily.   Previous Medications    ASPIRIN (ECOTRIN) 81 MG EC TABLET    Take 81 mg by mouth once daily.    ATORVASTATIN (LIPITOR) 40 MG TABLET    Take 40 mg by mouth every evening.    CILOSTAZOL (PLETAL) 100 MG TAB    Take 1 tablet (100 mg total) by mouth 2 (two) times daily.    CLOPIDOGREL (PLAVIX) 75 MG TABLET    TAKE 1 TABLET (75 MG TOTAL) BY MOUTH ONCE DAILY.    CYANOCOBALAMIN (VITAMIN B-12) 1000 MCG TABLET    Take 100 mcg by mouth once daily.    FERROUS SULFATE 325 MG (65 MG IRON) TAB TABLET    Take 325 mg by mouth daily with  breakfast.    FISH OIL-OMEGA-3 FATTY ACIDS 300-1,000 MG CAPSULE    Take 1 capsule by mouth once daily.    METFORMIN (GLUCOPHAGE) 500 MG TABLET    Take 750 mg by mouth 2 (two) times daily with meals.     MULTIVITAMIN (THERAGRAN) PER TABLET    Take 1 tablet by mouth once daily.   Modified Medications    No medications on file   Discontinued Medications    AMLODIPINE-BENAZEPRIL 5-20 MG (LOTREL) 5-20 MG PER CAPSULE    TAKE 1 CAPSULE BY MOUTH EVERY DAY

## 2019-11-20 NOTE — PATIENT INSTRUCTIONS

## 2020-08-27 ENCOUNTER — TELEPHONE (OUTPATIENT)
Dept: CARDIOLOGY | Facility: CLINIC | Age: 60
End: 2020-08-27

## 2021-02-03 ENCOUNTER — TELEPHONE (OUTPATIENT)
Dept: CARDIOLOGY | Facility: CLINIC | Age: 61
End: 2021-02-03

## 2021-02-03 DIAGNOSIS — E78.5 HYPERLIPIDEMIA ASSOCIATED WITH TYPE 2 DIABETES MELLITUS: Primary | ICD-10-CM

## 2021-02-03 DIAGNOSIS — R09.89 BILATERAL CAROTID BRUITS: ICD-10-CM

## 2021-02-03 DIAGNOSIS — I44.1 2ND DEGREE AV BLOCK: ICD-10-CM

## 2021-02-03 DIAGNOSIS — I73.9 PAD (PERIPHERAL ARTERY DISEASE): ICD-10-CM

## 2021-02-03 DIAGNOSIS — R00.1 BRADYCARDIA: ICD-10-CM

## 2021-02-03 DIAGNOSIS — I25.10 CORONARY ARTERY DISEASE INVOLVING NATIVE CORONARY ARTERY OF NATIVE HEART WITHOUT ANGINA PECTORIS: ICD-10-CM

## 2021-02-03 DIAGNOSIS — I10 ESSENTIAL HYPERTENSION: ICD-10-CM

## 2021-02-03 DIAGNOSIS — N17.9 AKI (ACUTE KIDNEY INJURY): ICD-10-CM

## 2021-02-03 DIAGNOSIS — R07.9 CHEST PAIN, UNSPECIFIED TYPE: ICD-10-CM

## 2021-02-03 DIAGNOSIS — R01.1 CARDIAC MURMUR: ICD-10-CM

## 2021-02-03 DIAGNOSIS — I70.213 ATHEROSCLEROSIS OF NATIVE ARTERY OF BOTH LOWER EXTREMITIES WITH INTERMITTENT CLAUDICATION: ICD-10-CM

## 2021-02-03 DIAGNOSIS — E11.69 HYPERLIPIDEMIA ASSOCIATED WITH TYPE 2 DIABETES MELLITUS: Primary | ICD-10-CM

## 2021-02-03 DIAGNOSIS — I44.1 AV BLOCK, MOBITZ 1: ICD-10-CM

## 2021-02-19 ENCOUNTER — HOSPITAL ENCOUNTER (OUTPATIENT)
Dept: CARDIOLOGY | Facility: HOSPITAL | Age: 61
Discharge: HOME | End: 2021-02-19
Attending: INTERNAL MEDICINE
Payer: COMMERCIAL

## 2021-02-19 ENCOUNTER — HOSPITAL ENCOUNTER (OUTPATIENT)
Dept: RADIOLOGY | Facility: HOSPITAL | Age: 61
Discharge: HOME | End: 2021-02-19
Attending: INTERNAL MEDICINE
Payer: COMMERCIAL

## 2021-02-19 VITALS — BODY MASS INDEX: 29.03 KG/M2 | WEIGHT: 196 LBS | HEIGHT: 69 IN

## 2021-02-19 DIAGNOSIS — R01.1 CARDIAC MURMUR: ICD-10-CM

## 2021-02-19 DIAGNOSIS — I10 ESSENTIAL HYPERTENSION: ICD-10-CM

## 2021-02-19 DIAGNOSIS — I73.9 PAD (PERIPHERAL ARTERY DISEASE): ICD-10-CM

## 2021-02-19 DIAGNOSIS — I25.10 CORONARY ARTERY DISEASE INVOLVING NATIVE CORONARY ARTERY OF NATIVE HEART WITHOUT ANGINA PECTORIS: ICD-10-CM

## 2021-02-19 DIAGNOSIS — I70.213 ATHEROSCLEROSIS OF NATIVE ARTERY OF BOTH LOWER EXTREMITIES WITH INTERMITTENT CLAUDICATION: ICD-10-CM

## 2021-02-19 LAB
AORTIC ROOT ANNULUS: 3.12 CM
AORTIC VALVE CUSP SEPERATION: 1.21 CM
AV INDEX (PROSTH): 0.38
AV MEAN GRADIENT: 37 MMHG
AV PEAK GRADIENT: 63 MMHG
AV VALVE AREA: 1.71 CM2
AV VELOCITY RATIO: 0.34
BSA FOR ECHO PROCEDURE: 2.08 M2
CV ECHO LV RWT: 0.72 CM
DOP CALC AO PEAK VEL: 3.98 M/S
DOP CALC AO VTI: 97.45 CM
DOP CALC LVOT AREA: 4.4 CM2
DOP CALC LVOT DIAMETER: 2.38 CM
DOP CALC LVOT PEAK VEL: 1.34 M/S
DOP CALC LVOT STROKE VOLUME: 166.7 CM3
DOP CALCLVOT PEAK VEL VTI: 37.49 CM
E WAVE DECELERATION TIME: 242.15 MSEC
E/A RATIO: 0.89
E/E' RATIO: 23.08 M/S
ECHO LV POSTERIOR WALL: 1.53 CM (ref 0.6–1.1)
FRACTIONAL SHORTENING: 27 % (ref 28–44)
INTERVENTRICULAR SEPTUM: 1.31 CM (ref 0.6–1.1)
IVC PROX: 1.5 CM
LA MAJOR: 5.58 CM
LA MINOR: 5.77 CM
LA WIDTH: 4.4 CM
LEFT ATRIUM SIZE: 4.06 CM
LEFT ATRIUM VOLUME INDEX MOD: 28.2 ML/M2
LEFT ATRIUM VOLUME INDEX: 42 ML/M2
LEFT ATRIUM VOLUME MOD: 57.75 CM3
LEFT ATRIUM VOLUME: 86.15 CM3
LEFT INTERNAL DIMENSION IN SYSTOLE: 3.1 CM (ref 2.1–4)
LEFT VENTRICLE DIASTOLIC VOLUME INDEX: 39.13 ML/M2
LEFT VENTRICLE DIASTOLIC VOLUME: 80.22 ML
LEFT VENTRICLE MASS INDEX: 113 G/M2
LEFT VENTRICLE SYSTOLIC VOLUME INDEX: 18.5 ML/M2
LEFT VENTRICLE SYSTOLIC VOLUME: 37.86 ML
LEFT VENTRICULAR INTERNAL DIMENSION IN DIASTOLE: 4.24 CM (ref 3.5–6)
LEFT VENTRICULAR MASS: 232.45 G
LV LATERAL E/E' RATIO: 25 M/S
LV SEPTAL E/E' RATIO: 21.43 M/S
MV A" WAVE DURATION": 13.84 MSEC
MV MEAN GRADIENT: 1 MMHG
MV PEAK A VEL: 1.69 M/S
MV PEAK E VEL: 1.5 M/S
MV PEAK GRADIENT: 16 MMHG
MV STENOSIS PRESSURE HALF TIME: 70.22 MS
MV VALVE AREA P 1/2 METHOD: 3.13 CM2
PISA MRMAX VEL: 0.06 M/S
PISA TR MAX VEL: 2.32 M/S
PULM VEIN S/D RATIO: 1.8
PV PEAK D VEL: 0.46 M/S
PV PEAK S VEL: 0.83 M/S
PV PEAK VELOCITY: 1.21 CM/S
RA MAJOR: 5.53 CM
RA PRESSURE: 3 MMHG
RA WIDTH: 4.72 CM
RIGHT VENTRICULAR END-DIASTOLIC DIMENSION: 2.65 CM
RV TISSUE DOPPLER FREE WALL SYSTOLIC VELOCITY 1 (APICAL 4 CHAMBER VIEW): 22.97 CM/S
TDI LATERAL: 0.06 M/S
TDI SEPTAL: 0.07 M/S
TDI: 0.07 M/S
TR MAX PG: 22 MMHG
TRICUSPID ANNULAR PLANE SYSTOLIC EXCURSION: 3.05 CM
TV REST PULMONARY ARTERY PRESSURE: 25 MMHG

## 2021-02-19 PROCEDURE — 93306 TTE W/DOPPLER COMPLETE: CPT | Mod: PO

## 2021-02-19 PROCEDURE — 93306 TTE W/DOPPLER COMPLETE: CPT | Mod: 26,,, | Performed by: INTERNAL MEDICINE

## 2021-02-19 PROCEDURE — 93306 ECHO (CUPID ONLY): ICD-10-PCS | Mod: 26,,, | Performed by: INTERNAL MEDICINE

## 2021-02-19 PROCEDURE — 93925 LOWER EXTREMITY STUDY: CPT | Mod: TC,PO

## 2021-03-17 ENCOUNTER — OFFICE VISIT (OUTPATIENT)
Dept: CARDIOLOGY | Facility: CLINIC | Age: 61
End: 2021-03-17
Payer: COMMERCIAL

## 2021-03-17 VITALS
HEART RATE: 92 BPM | SYSTOLIC BLOOD PRESSURE: 128 MMHG | DIASTOLIC BLOOD PRESSURE: 69 MMHG | WEIGHT: 186 LBS | OXYGEN SATURATION: 97 % | HEIGHT: 69 IN | BODY MASS INDEX: 27.55 KG/M2

## 2021-03-17 DIAGNOSIS — I25.10 CORONARY ARTERY DISEASE INVOLVING NATIVE CORONARY ARTERY OF NATIVE HEART WITHOUT ANGINA PECTORIS: Primary | ICD-10-CM

## 2021-03-17 DIAGNOSIS — I70.213 ATHEROSCLEROSIS OF NATIVE ARTERY OF BOTH LOWER EXTREMITIES WITH INTERMITTENT CLAUDICATION: ICD-10-CM

## 2021-03-17 DIAGNOSIS — I10 ESSENTIAL HYPERTENSION: ICD-10-CM

## 2021-03-17 DIAGNOSIS — I35.0 MODERATE AORTIC STENOSIS: ICD-10-CM

## 2021-03-17 DIAGNOSIS — E11.69 HYPERLIPIDEMIA ASSOCIATED WITH TYPE 2 DIABETES MELLITUS: ICD-10-CM

## 2021-03-17 DIAGNOSIS — E78.5 HYPERLIPIDEMIA ASSOCIATED WITH TYPE 2 DIABETES MELLITUS: ICD-10-CM

## 2021-03-17 DIAGNOSIS — R09.89 BILATERAL CAROTID BRUITS: ICD-10-CM

## 2021-03-17 DIAGNOSIS — I44.1 AV BLOCK, MOBITZ 1: ICD-10-CM

## 2021-03-17 PROCEDURE — 99999 PR PBB SHADOW E&M-EST. PATIENT-LVL III: CPT | Mod: PBBFAC,,, | Performed by: INTERNAL MEDICINE

## 2021-03-17 PROCEDURE — 1126F PR PAIN SEVERITY QUANTIFIED, NO PAIN PRESENT: ICD-10-PCS | Mod: S$GLB,,, | Performed by: INTERNAL MEDICINE

## 2021-03-17 PROCEDURE — 1126F AMNT PAIN NOTED NONE PRSNT: CPT | Mod: S$GLB,,, | Performed by: INTERNAL MEDICINE

## 2021-03-17 PROCEDURE — 3074F SYST BP LT 130 MM HG: CPT | Mod: CPTII,S$GLB,, | Performed by: INTERNAL MEDICINE

## 2021-03-17 PROCEDURE — 99215 PR OFFICE/OUTPT VISIT, EST, LEVL V, 40-54 MIN: ICD-10-PCS | Mod: S$GLB,,, | Performed by: INTERNAL MEDICINE

## 2021-03-17 PROCEDURE — 99215 OFFICE O/P EST HI 40 MIN: CPT | Mod: S$GLB,,, | Performed by: INTERNAL MEDICINE

## 2021-03-17 PROCEDURE — 3074F PR MOST RECENT SYSTOLIC BLOOD PRESSURE < 130 MM HG: ICD-10-PCS | Mod: CPTII,S$GLB,, | Performed by: INTERNAL MEDICINE

## 2021-03-17 PROCEDURE — 3008F BODY MASS INDEX DOCD: CPT | Mod: CPTII,S$GLB,, | Performed by: INTERNAL MEDICINE

## 2021-03-17 PROCEDURE — 3078F PR MOST RECENT DIASTOLIC BLOOD PRESSURE < 80 MM HG: ICD-10-PCS | Mod: CPTII,S$GLB,, | Performed by: INTERNAL MEDICINE

## 2021-03-17 PROCEDURE — 99999 PR PBB SHADOW E&M-EST. PATIENT-LVL III: ICD-10-PCS | Mod: PBBFAC,,, | Performed by: INTERNAL MEDICINE

## 2021-03-17 PROCEDURE — 3008F PR BODY MASS INDEX (BMI) DOCUMENTED: ICD-10-PCS | Mod: CPTII,S$GLB,, | Performed by: INTERNAL MEDICINE

## 2021-03-17 PROCEDURE — 3078F DIAST BP <80 MM HG: CPT | Mod: CPTII,S$GLB,, | Performed by: INTERNAL MEDICINE

## 2021-03-18 ENCOUNTER — IMMUNIZATION (OUTPATIENT)
Dept: PRIMARY CARE CLINIC | Facility: CLINIC | Age: 61
End: 2021-03-18

## 2021-03-18 DIAGNOSIS — Z23 NEED FOR VACCINATION: Primary | ICD-10-CM

## 2021-04-08 ENCOUNTER — IMMUNIZATION (OUTPATIENT)
Dept: PRIMARY CARE CLINIC | Facility: CLINIC | Age: 61
End: 2021-04-08

## 2021-04-08 DIAGNOSIS — Z23 NEED FOR VACCINATION: Primary | ICD-10-CM

## 2022-05-17 ENCOUNTER — TELEPHONE (OUTPATIENT)
Dept: CARDIOLOGY | Facility: CLINIC | Age: 62
End: 2022-05-17
Payer: COMMERCIAL

## 2022-05-17 NOTE — TELEPHONE ENCOUNTER
RE: Cardiac Clearance  Received: Today  MD Rossana Donahue MA  Caller: Jeny campbell/Dr Ribeiro   [phone] (1 week ago)        Patient can proceed with non cardiac surgery with acceptable risks         Hold aspirin, pletal, and plavix 5 days before the case       Thanks         ZN

## 2022-08-31 ENCOUNTER — OFFICE VISIT (OUTPATIENT)
Dept: CARDIOLOGY | Facility: CLINIC | Age: 62
End: 2022-08-31
Payer: COMMERCIAL

## 2022-08-31 ENCOUNTER — TELEPHONE (OUTPATIENT)
Dept: CARDIOLOGY | Facility: CLINIC | Age: 62
End: 2022-08-31
Payer: COMMERCIAL

## 2022-08-31 VITALS
WEIGHT: 183 LBS | HEIGHT: 69 IN | DIASTOLIC BLOOD PRESSURE: 71 MMHG | HEART RATE: 74 BPM | BODY MASS INDEX: 27.11 KG/M2 | SYSTOLIC BLOOD PRESSURE: 125 MMHG

## 2022-08-31 DIAGNOSIS — R07.9 CHEST PAIN, UNSPECIFIED TYPE: ICD-10-CM

## 2022-08-31 DIAGNOSIS — I25.10 CORONARY ARTERY DISEASE INVOLVING NATIVE CORONARY ARTERY OF NATIVE HEART WITHOUT ANGINA PECTORIS: Primary | ICD-10-CM

## 2022-08-31 DIAGNOSIS — N52.8 OTHER MALE ERECTILE DYSFUNCTION: ICD-10-CM

## 2022-08-31 DIAGNOSIS — I10 ESSENTIAL HYPERTENSION: ICD-10-CM

## 2022-08-31 DIAGNOSIS — I73.9 PAD (PERIPHERAL ARTERY DISEASE): ICD-10-CM

## 2022-08-31 DIAGNOSIS — E11.9 TYPE 2 DIABETES MELLITUS WITHOUT COMPLICATION, WITHOUT LONG-TERM CURRENT USE OF INSULIN: ICD-10-CM

## 2022-08-31 DIAGNOSIS — E11.69 HYPERLIPIDEMIA ASSOCIATED WITH TYPE 2 DIABETES MELLITUS: ICD-10-CM

## 2022-08-31 DIAGNOSIS — E78.5 HYPERLIPIDEMIA ASSOCIATED WITH TYPE 2 DIABETES MELLITUS: ICD-10-CM

## 2022-08-31 DIAGNOSIS — I70.213 ATHEROSCLEROSIS OF NATIVE ARTERY OF BOTH LOWER EXTREMITIES WITH INTERMITTENT CLAUDICATION: ICD-10-CM

## 2022-08-31 DIAGNOSIS — I35.0 MODERATE AORTIC STENOSIS: ICD-10-CM

## 2022-08-31 DIAGNOSIS — R00.1 BRADYCARDIA: ICD-10-CM

## 2022-08-31 DIAGNOSIS — I25.111 CORONARY ARTERY DISEASE INVOLVING NATIVE CORONARY ARTERY OF NATIVE HEART WITH ANGINA PECTORIS WITH DOCUMENTED SPASM: ICD-10-CM

## 2022-08-31 DIAGNOSIS — I44.1 AV BLOCK, MOBITZ 1: Primary | ICD-10-CM

## 2022-08-31 DIAGNOSIS — I44.1 AV BLOCK, MOBITZ 1: ICD-10-CM

## 2022-08-31 PROCEDURE — 3078F DIAST BP <80 MM HG: CPT | Mod: CPTII,S$GLB,, | Performed by: INTERNAL MEDICINE

## 2022-08-31 PROCEDURE — 93000 ELECTROCARDIOGRAM COMPLETE: CPT | Mod: S$GLB,,, | Performed by: INTERNAL MEDICINE

## 2022-08-31 PROCEDURE — 4010F ACE/ARB THERAPY RXD/TAKEN: CPT | Mod: CPTII,S$GLB,, | Performed by: INTERNAL MEDICINE

## 2022-08-31 PROCEDURE — 99215 OFFICE O/P EST HI 40 MIN: CPT | Mod: S$GLB,,, | Performed by: INTERNAL MEDICINE

## 2022-08-31 PROCEDURE — 99999 PR PBB SHADOW E&M-EST. PATIENT-LVL III: CPT | Mod: PBBFAC,,, | Performed by: INTERNAL MEDICINE

## 2022-08-31 PROCEDURE — 3074F PR MOST RECENT SYSTOLIC BLOOD PRESSURE < 130 MM HG: ICD-10-PCS | Mod: CPTII,S$GLB,, | Performed by: INTERNAL MEDICINE

## 2022-08-31 PROCEDURE — 93000 EKG 12-LEAD: ICD-10-PCS | Mod: S$GLB,,, | Performed by: INTERNAL MEDICINE

## 2022-08-31 PROCEDURE — 1159F MED LIST DOCD IN RCRD: CPT | Mod: CPTII,S$GLB,, | Performed by: INTERNAL MEDICINE

## 2022-08-31 PROCEDURE — 3074F SYST BP LT 130 MM HG: CPT | Mod: CPTII,S$GLB,, | Performed by: INTERNAL MEDICINE

## 2022-08-31 PROCEDURE — 3078F PR MOST RECENT DIASTOLIC BLOOD PRESSURE < 80 MM HG: ICD-10-PCS | Mod: CPTII,S$GLB,, | Performed by: INTERNAL MEDICINE

## 2022-08-31 PROCEDURE — 99999 PR PBB SHADOW E&M-EST. PATIENT-LVL III: ICD-10-PCS | Mod: PBBFAC,,, | Performed by: INTERNAL MEDICINE

## 2022-08-31 PROCEDURE — 3008F BODY MASS INDEX DOCD: CPT | Mod: CPTII,S$GLB,, | Performed by: INTERNAL MEDICINE

## 2022-08-31 PROCEDURE — 1159F PR MEDICATION LIST DOCUMENTED IN MEDICAL RECORD: ICD-10-PCS | Mod: CPTII,S$GLB,, | Performed by: INTERNAL MEDICINE

## 2022-08-31 PROCEDURE — 4010F PR ACE/ARB THEARPY RXD/TAKEN: ICD-10-PCS | Mod: CPTII,S$GLB,, | Performed by: INTERNAL MEDICINE

## 2022-08-31 PROCEDURE — 3008F PR BODY MASS INDEX (BMI) DOCUMENTED: ICD-10-PCS | Mod: CPTII,S$GLB,, | Performed by: INTERNAL MEDICINE

## 2022-08-31 PROCEDURE — 99215 PR OFFICE/OUTPT VISIT, EST, LEVL V, 40-54 MIN: ICD-10-PCS | Mod: S$GLB,,, | Performed by: INTERNAL MEDICINE

## 2022-08-31 RX ORDER — FOLIC ACID 0.4 MG
400 TABLET ORAL DAILY
COMMUNITY

## 2022-08-31 RX ORDER — NITROGLYCERIN 0.4 MG/1
0.4 TABLET SUBLINGUAL EVERY 5 MIN PRN
Qty: 20 TABLET | Refills: 12 | Status: SHIPPED | OUTPATIENT
Start: 2022-08-31 | End: 2022-09-30

## 2022-08-31 NOTE — PROGRESS NOTES
Subjective:   Patient ID:  Shimon Ch Jr. is a 61 y.o. male who presents for evaluation and treatment of Coronary Artery Disease, Peripheral Arterial Disease, Hyperlipidemia, and Hypertension      HPI:       Shimon Ch Jr. 61 y.o. male is here follow up and complaining of chest discomfort at times walking not from his sleep.  He does not have much recollection whether occurs consistently with exertion.  He has no recollection whether it is similar to his angina from 2008.  He has not taken sublingual nitroglycerin.  Chest discomfort is occasion associated with dyspnea.  EKG August 31, 2022:  Normal sinus rhythm with left ventricular hypertrophy with repolarization.      He has known PAD s/p bilateral SFA revascularization in 2018. He also has a h/o single vessel PCI in 2008 (unknown vessel). No angina. No TIA/CVA. He takes aspirin and plavix for DAPT. He takes statin and acei.        ECG 9/8/2019: NSR with 2nd AVB with 2:1 with VR 37  ECG 9/10/2019: NSR with 1 AVB      Arterial ultrasound done at [facility] 9/2017   No films available for review      Report states:     Bilateral CFA disease with monophasic bilateral SFA velocities    Bilateral PT high grade disease      SUSANNE 4/2018   R 0.61   L 0.76      Carotid US 1/018   R ICA PSV 90 cm/sec   L ICA  cm/sec    Echo 1/2018   Normal EF   Aortic sclerosis with MG 10 mmHg   PASP 14 mmHg      Peripheral intervention 6/2018     PTA of L POP with 6.0 x 80 mm Lutonix DCB   PTA of L SFA with 6.0 x 150 + 6.0 x 120 mm Lutonix DCB        Peripheral intervention 7/20/2018       Crossed R SFA with antegrade wire manipulation   Atherectomy with 2.0 Classic CSI   PTA of distal SFA and proximal SFA with 6.0 x 150 mm Lutonix DCB   PTAS of mid SFA ISR  with 7.0 x 100 mm Zilver PTX          Echo 9/2019     EF 65%   Grade II diastolic dysfunction   Mild AS with MG 19 mmHg   PASP 21 mmHg   Normal RV function        Holter 9/2019      NSR with HR varying between 47  and 128 bpm with an average of 85 bpm.  There were very rare PVCs totalling 1 and averaging 0.02 per hour.  There were very rare PACs totalling 65 and averaging 1.35 per hour.  The longest RR interval was 2000 msec.      SUSANNE 10/2019     Non compressible vessels       US 10/2019       Patent R CFA   R SFA  with distal reconstitution   Patent R AT + DP   High grade L SFA disease    Patent L AT + DP    Echo 2/2021     EF 70% with grade II DD   Mod LAE   Mod AS with NESSA 1.7 w MG 37 mmHg   Mild-mod AI   PASP 25 mmHg           Care team:     Dr. JULISSA Ruiz (PCP)        Patient Active Problem List    Diagnosis Date Noted    Atherosclerosis of native artery of both lower extremities with intermittent claudication 11/15/2017     Priority: Low    Moderate aortic stenosis 03/17/2021    Other male erectile dysfunction 11/20/2019    2nd degree AV block 09/10/2019    AV block, Mobitz 1 09/09/2019    Chest pain 09/08/2019    Bradycardia 09/08/2019    WANG (acute kidney injury) 09/08/2019    Pseudoaneurysm 08/09/2018    Coronary artery disease involving native coronary artery of native heart with angina pectoris with documented spasm 11/15/2017         S/P PCI in 2008  Unknown vessel  [place]      Essential hypertension 11/15/2017    Type 2 diabetes mellitus without complication, without long-term current use of insulin 11/15/2017    Hyperlipidemia associated with type 2 diabetes mellitus 11/15/2017    PAD (peripheral artery disease) 11/15/2017         S/p R EIA + mid SFA PTAS in 2008  [place]      Diameters   CFA 8 mm   SFA 7 mm   POP 6 mm   BTK 3/3.5 mm      SUSANNE 4/2018   R 0.61   L 0.76      Arterial ultrasound done at [place] 9/2017                                          Report states:                            Bilateral CFA disease with monophasic bilateral SFA velocities                          Bilateral PT high grade disease        US 6/2018 [facility]         R CFA pSFA mSFA dSFA pPOP dPOP PT AT DP  L CFA pSFA  mSFA dSFA pPOP dPOP PT AT DP        Peripheral intervention 2018       Aorta + bilateral renal arteries are patent   Bilateral ARIEL, EIA, and CFA are patent   Ostial R SFA + mid SFA  with distal reconstitution from DFA   3 vessel run off on R foot          Prox, mid, and distal L SFA  with 95% L POP stenosis   2 vessel run off via AT + PER       Intact plantar arches bilaterally        PTA of L POP with 6.0 x 80 mm Lutonix DCB   PTA of L SFA with 6.0 x 150 + 6.0 x 120 mm Lutonix DCB        Peripheral intervention 2018       Crossed R SFA with antegrade wire manipulation   Atherectomy with 2.0 Classic CSI   PTA of distal SFA and proximal SFA with 6.0 x 150 mm Lutonix DCB   PTAS of mid SFA ISR  with 7.0 x 100 mm Zilver PTX                               Bilateral carotid bruits 11/15/2017    Cardiac murmur 11/15/2017           Right Arm BP - Sittin/72  Left Arm BP - Sittin/71            Review of Systems   Constitutional: Negative for diaphoresis, night sweats, weight gain and weight loss.   HENT:  Negative for congestion.    Eyes:  Negative for blurred vision, discharge and double vision.   Cardiovascular:  Positive for claudication. Negative for chest pain, cyanosis, dyspnea on exertion, irregular heartbeat, leg swelling, near-syncope, orthopnea, palpitations, paroxysmal nocturnal dyspnea and syncope.   Respiratory:  Negative for cough, shortness of breath and wheezing.    Endocrine: Negative for cold intolerance, heat intolerance and polyphagia.   Hematologic/Lymphatic: Negative for adenopathy and bleeding problem. Does not bruise/bleed easily.   Skin:  Negative for dry skin and nail changes.   Musculoskeletal:  Negative for arthritis, back pain, falls, joint pain, myalgias and neck pain.   Gastrointestinal:  Negative for bloating, abdominal pain, change in bowel habit and constipation.   Genitourinary:  Negative for bladder incontinence, dysuria, flank pain, genital sores and missed  menses.   Neurological:  Negative for aphonia, brief paralysis, difficulty with concentration, dizziness and weakness.   Psychiatric/Behavioral:  Negative for altered mental status and memory loss. The patient does not have insomnia.    Allergic/Immunologic: Negative for environmental allergies.     Objective:   Physical Exam  Constitutional:       Appearance: He is well-developed.      Interventions: He is not intubated.  HENT:      Head: Normocephalic and atraumatic.      Right Ear: External ear normal.      Left Ear: External ear normal.   Eyes:      General: No scleral icterus.        Right eye: No discharge.         Left eye: No discharge.      Conjunctiva/sclera: Conjunctivae normal.      Pupils: Pupils are equal, round, and reactive to light.   Neck:      Thyroid: No thyromegaly.      Vascular: Normal carotid pulses. No carotid bruit, hepatojugular reflux or JVD.      Trachea: No tracheal deviation.   Cardiovascular:      Rate and Rhythm: Normal rate and regular rhythm. No extrasystoles are present.     Chest Wall: PMI is not displaced.      Pulses:           Carotid pulses are 2+ on the right side with bruit and 2+ on the left side with bruit.       Radial pulses are 2+ on the right side and 2+ on the left side.        Femoral pulses are 2+ on the right side and 2+ on the left side.       Popliteal pulses are 2+ on the right side and 2+ on the left side.        Dorsalis pedis pulses are 0 on the right side and 0 on the left side.        Posterior tibial pulses are 0 on the right side and 0 on the left side.      Heart sounds: S1 normal and S2 normal. Heart sounds not distant. No midsystolic click. Murmur heard.   Systolic murmur is present with a grade of 3/6 at the upper right sternal border radiating to the neck and apex.     No friction rub. No gallop. No S3 sounds.      Comments:     Biphasic R PT and monophasic R DP doppler signals     Biphasic L DP and faint L PT doppler signals         Pulmonary:       Effort: Pulmonary effort is normal. No tachypnea, bradypnea, accessory muscle usage or respiratory distress. He is not intubated.      Breath sounds: Normal breath sounds. No stridor. No decreased breath sounds, wheezing or rales.   Chest:      Chest wall: No tenderness.   Abdominal:      General: There is no distension or abdominal bruit.      Palpations: There is no mass or pulsatile mass.      Tenderness: There is no abdominal tenderness. There is no guarding or rebound.   Musculoskeletal:         General: No tenderness. Normal range of motion.      Cervical back: Normal range of motion and neck supple.   Lymphadenopathy:      Cervical: No cervical adenopathy.   Skin:     General: Skin is warm.      Coloration: Skin is not pale.      Findings: No erythema or rash.      Comments:     No ulcers       Neurological:      Mental Status: He is alert and oriented to person, place, and time.      Cranial Nerves: No cranial nerve deficit.      Coordination: Coordination normal.      Deep Tendon Reflexes: Reflexes are normal and symmetric.   Psychiatric:         Behavior: Behavior normal.         Thought Content: Thought content normal.         Judgment: Judgment normal.       Assessment:     1. Coronary artery disease involving native coronary artery of native heart without angina pectoris    2. Atherosclerosis of native artery of both lower extremities with intermittent claudication    3. Essential hypertension    4. Hyperlipidemia associated with type 2 diabetes mellitus    5. PAD (peripheral artery disease)    6. Bradycardia    7. AV block, Mobitz 1    8. Other male erectile dysfunction    9. Type 2 diabetes mellitus without complication, without long-term current use of insulin    10. Chest pain, unspecified type    11. Coronary artery disease involving native coronary artery of native heart with angina pectoris with documented spasm    12. Moderate aortic stenosis          Plan:       Obtain nuclear stress test to  rule out ischemia.  Echocardiogram to assess ejection fraction and severety of aortic stenosis.  Obtain ankle-brachial index.  Obtain labs: CBC, CMP, and lipid profile.  Follow-up after above-mentioned tests  He was instructed to go to the emergency room if symptoms do not subside or resolve after sublingual nitroglycerin.  0.4 sublingual nitroglycerin as needed for chest discomfort up to 3 tablets 5 minutes apart.        Continue with walking program  Continue with aspirin + plavix for DAPT  Target BP < 130/80 mmHg  He can take Viagra for ED        Weight loss  Exercise  Low salt and carbohydrate diet            Continue with current medical plan and lifestyle changes.  Return sooner for concerns or questions. If symptoms persist go to the ED  I have reviewed all pertinent data on this patient       I have reviewed the patient's medical history in detail and updated the computerized patient record.    Orders Placed This Encounter   Procedures    NM Myocardial Perfusion Spect Multi Pharmacologic     Standing Status:   Future     Standing Expiration Date:   8/31/2023     Order Specific Question:   May the Radiologist modify the order per protocol to meet the clinical needs of the patient?     Answer:   Yes     Order Specific Question:   Stress Medication to use:     Answer:   Regadenoson     Order Specific Question:   Will a Cardiologist read this study?     Answer:   No    CBC Auto Differential     Standing Status:   Future     Standing Expiration Date:   2/29/2024    Comprehensive Metabolic Panel     Standing Status:   Future     Standing Expiration Date:   2/29/2024    Lipid Panel     Standing Status:   Future     Standing Expiration Date:   2/29/2024    CBC Auto Differential     Standing Status:   Future     Standing Expiration Date:   2/29/2024    Comprehensive Metabolic Panel     Standing Status:   Future     Standing Expiration Date:   2/29/2024    Lipid Panel     Standing Status:   Future     Standing  Expiration Date:   2/29/2024    Ankle Brachial Indices (SUSANNE)     Standing Status:   Future     Standing Expiration Date:   8/31/2023     Order Specific Question:   Exam Type:     Answer:   Exercise     Order Specific Question:   Release to patient     Answer:   Immediate    Nuclear Stress Test     Standing Status:   Future     Standing Expiration Date:   8/31/2023     Order Specific Question:   Which stress agent will be used?     Answer:   Pharm     Order Specific Question:   Which medicaton for the stress procedure?     Answer:   Regadenoson     Order Specific Question:   Release to patient     Answer:   Immediate    Ankle Brachial Indices (SUSANNE)     Standing Status:   Future     Standing Expiration Date:   8/31/2023     Order Specific Question:   Exam Type:     Answer:   Exercise     Order Specific Question:   Release to patient     Answer:   Immediate    EKG 12-lead    Echo     Standing Status:   Future     Standing Expiration Date:   8/31/2023     Order Specific Question:   Release to patient     Answer:   Immediate         Follow up as scheduled. Return sooner for concerns or questions            He expressed verbal understanding and agreed with the plan      Greater than 50% of the visit of 45 minutes was spent counseling, educating, and coordinating the care of the patient.  -In today's visit, at least 4 established conditions that pose a risk to life or bodily function have been addressed and the conditions are severe.    -In today's visit, monitoring for drug toxicity was accomplished.          Patient's Medications   New Prescriptions    NITROGLYCERIN (NITROSTAT) 0.4 MG SL TABLET    Place 1 tablet (0.4 mg total) under the tongue every 5 (five) minutes as needed for Chest pain.   Previous Medications    AMLODIPINE-BENAZEPRIL 10-20MG (LOTREL) 10-20 MG PER CAPSULE    TAKE 1 CAPSULE BY MOUTH EVERY DAY    ASPIRIN (ECOTRIN) 81 MG EC TABLET    Take 81 mg by mouth once daily.    ATORVASTATIN (LIPITOR) 40 MG TABLET     Take 40 mg by mouth every evening.    CILOSTAZOL (PLETAL) 100 MG TAB    TAKE 1 TABLET BY MOUTH TWICE A DAY    CLOPIDOGREL (PLAVIX) 75 MG TABLET    Take 1 tablet (75 mg total) by mouth once daily.    CYANOCOBALAMIN (VITAMIN B-12) 1000 MCG TABLET    Take 100 mcg by mouth once daily.    FERROUS SULFATE 325 MG (65 MG IRON) TAB TABLET    Take 325 mg by mouth daily with breakfast.    FISH OIL-OMEGA-3 FATTY ACIDS 300-1,000 MG CAPSULE    Take 1 capsule by mouth once daily.    FOLIC ACID (FOLVITE) 400 MCG TABLET    Take 400 mcg by mouth once daily.    METFORMIN (GLUCOPHAGE) 500 MG TABLET    Take 750 mg by mouth 2 (two) times daily with meals.     MULTIVITAMIN (THERAGRAN) PER TABLET    Take 1 tablet by mouth once daily.   Modified Medications    No medications on file   Discontinued Medications

## 2022-08-31 NOTE — LETTER
August 31, 2022        Sabino Ruiz MD  [address]  [address]  [place] [address]             [facility]  [address], [address]  [place] [address]  Phone: [phone]  Fax: [phone]   Patient: Shimon Ch Jr.   MR Number: [id number]   YOB: 1960   Date of Visit: 8/31/2022       Dear Dr. Ruiz:    Thank you for referring Shimon Ch to me for evaluation. Attached you will find relevant portions of my assessment and plan of care.    If you have questions, please do not hesitate to call me. I look forward to following Shimon Ch along with you.    Sincerely,      Grey Angela MD            CC  No Recipients    Enclosure

## 2022-10-12 ENCOUNTER — HOSPITAL ENCOUNTER (OUTPATIENT)
Dept: RADIOLOGY | Facility: HOSPITAL | Age: 62
Discharge: HOME | End: 2022-10-12
Attending: INTERNAL MEDICINE
Payer: COMMERCIAL

## 2022-10-12 ENCOUNTER — HOSPITAL ENCOUNTER (OUTPATIENT)
Dept: CARDIOLOGY | Facility: HOSPITAL | Age: 62
Discharge: HOME | End: 2022-10-12
Attending: INTERNAL MEDICINE
Payer: COMMERCIAL

## 2022-10-12 VITALS — HEIGHT: 69 IN | BODY MASS INDEX: 27.11 KG/M2 | WEIGHT: 183 LBS

## 2022-10-12 DIAGNOSIS — I73.9 PAD (PERIPHERAL ARTERY DISEASE): ICD-10-CM

## 2022-10-12 DIAGNOSIS — R07.9 CHEST PAIN, UNSPECIFIED TYPE: ICD-10-CM

## 2022-10-12 DIAGNOSIS — I25.10 CORONARY ARTERY DISEASE INVOLVING NATIVE CORONARY ARTERY OF NATIVE HEART WITHOUT ANGINA PECTORIS: ICD-10-CM

## 2022-10-12 LAB
AORTIC ROOT ANNULUS: 3.19 CM
AORTIC VALVE CUSP SEPERATION: 1.04 CM
AV INDEX (PROSTH): 0.25
AV MEAN GRADIENT: 60 MMHG
AV PEAK GRADIENT: 95 MMHG
AV VALVE AREA: 0.75 CM2
AV VELOCITY RATIO: 0.24
BSA FOR ECHO PROCEDURE: 2.01 M2
CV ECHO LV RWT: 0.63 CM
CV STRESS BASE HR: 67 BPM
DIASTOLIC BLOOD PRESSURE: 71 MMHG
DOP CALC AO PEAK VEL: 4.87 M/S
DOP CALC AO VTI: 129.4 CM
DOP CALC LVOT AREA: 3 CM2
DOP CALC LVOT DIAMETER: 1.96 CM
DOP CALC LVOT PEAK VEL: 1.15 M/S
DOP CALC LVOT STROKE VOLUME: 96.8 CM3
DOP CALC MV VTI: 47.5 CM
DOP CALCLVOT PEAK VEL VTI: 32.1 CM
E WAVE DECELERATION TIME: 225.95 MSEC
E/A RATIO: 0.93
E/E' RATIO: 27.17 M/S
ECHO LV POSTERIOR WALL: 1.43 CM (ref 0.6–1.1)
EJECTION FRACTION: 55 %
FRACTIONAL SHORTENING: 26 % (ref 28–44)
INTERVENTRICULAR SEPTUM: 1.37 CM (ref 0.6–1.1)
IVRT: 79.92 MSEC
LA MAJOR: 5.98 CM
LA MINOR: 5.88 CM
LA WIDTH: 5 CM
LEFT ATRIUM SIZE: 4.11 CM
LEFT ATRIUM VOLUME INDEX MOD: 44.4 ML/M2
LEFT ATRIUM VOLUME INDEX: 52 ML/M2
LEFT ATRIUM VOLUME MOD: 88.44 CM3
LEFT ATRIUM VOLUME: 103.57 CM3
LEFT INTERNAL DIMENSION IN SYSTOLE: 3.37 CM (ref 2.1–4)
LEFT VENTRICLE DIASTOLIC VOLUME INDEX: 48.14 ML/M2
LEFT VENTRICLE DIASTOLIC VOLUME: 95.8 ML
LEFT VENTRICLE MASS INDEX: 128 G/M2
LEFT VENTRICLE SYSTOLIC VOLUME INDEX: 23.3 ML/M2
LEFT VENTRICLE SYSTOLIC VOLUME: 46.37 ML
LEFT VENTRICULAR INTERNAL DIMENSION IN DIASTOLE: 4.57 CM (ref 3.5–6)
LEFT VENTRICULAR MASS: 254.25 G
LV LATERAL E/E' RATIO: 23.29 M/S
LV SEPTAL E/E' RATIO: 32.6 M/S
LVOT MG: 2.88 MMHG
LVOT MV: 0.79 CM/S
MV MEAN GRADIENT: 7 MMHG
MV PEAK A VEL: 1.75 M/S
MV PEAK E VEL: 1.63 M/S
MV PEAK GRADIENT: 16 MMHG
MV STENOSIS PRESSURE HALF TIME: 65.52 MS
MV VALVE AREA BY CONTINUITY EQUATION: 2.04 CM2
MV VALVE AREA P 1/2 METHOD: 3.36 CM2
OHS CV CPX 85 PERCENT MAX PREDICTED HEART RATE MALE: 133
OHS CV CPX MAX PREDICTED HEART RATE: 156
OHS CV CPX PATIENT IS FEMALE: 0
OHS CV CPX PATIENT IS MALE: 1
OHS CV CPX PEAK DIASTOLIC BLOOD PRESSURE: 72 MMHG
OHS CV CPX PEAK HEAR RATE: 88 BPM
OHS CV CPX PEAK RATE PRESSURE PRODUCT: NORMAL
OHS CV CPX PEAK SYSTOLIC BLOOD PRESSURE: 139 MMHG
OHS CV CPX PERCENT MAX PREDICTED HEART RATE ACHIEVED: 56
OHS CV CPX RATE PRESSURE PRODUCT PRESENTING: 8710
PISA MRMAX VEL: 6.4 M/S
PISA TR MAX VEL: 3.28 M/S
PULM VEIN S/D RATIO: 0.97
PV MV: 0.85 M/S
PV PEAK D VEL: 0.58 M/S
PV PEAK S VEL: 0.56 M/S
PV PEAK VELOCITY: 1.37 CM/S
RA MAJOR: 5.82 CM
RA PRESSURE: 8 MMHG
RA WIDTH: 3.7 CM
RIGHT VENTRICULAR END-DIASTOLIC DIMENSION: 2.87 CM
SYSTOLIC BLOOD PRESSURE: 130 MMHG
TDI LATERAL: 0.07 M/S
TDI SEPTAL: 0.05 M/S
TDI: 0.06 M/S
TR MAX PG: 43 MMHG
TRICUSPID ANNULAR PLANE SYSTOLIC EXCURSION: 3.05 CM
TV REST PULMONARY ARTERY PRESSURE: 51 MMHG

## 2022-10-12 PROCEDURE — 78452 NM MYOCARDIAL PERFUSION SPECT MULTI PHARM: ICD-10-PCS | Mod: 26,,, | Performed by: STUDENT IN AN ORGANIZED HEALTH CARE EDUCATION/TRAINING PROGRAM

## 2022-10-12 PROCEDURE — 63600175 PHARM REV CODE 636 W HCPCS: Performed by: INTERNAL MEDICINE

## 2022-10-12 PROCEDURE — 93016 NUCLEAR STRESS TEST (CUPID ONLY): ICD-10-PCS | Mod: ,,, | Performed by: INTERNAL MEDICINE

## 2022-10-12 PROCEDURE — 93306 TTE W/DOPPLER COMPLETE: CPT

## 2022-10-12 PROCEDURE — 93922 ANKLE BRACHIAL INDICES (ABI): ICD-10-PCS | Mod: 26,,, | Performed by: INTERNAL MEDICINE

## 2022-10-12 PROCEDURE — 93306 TTE W/DOPPLER COMPLETE: CPT | Mod: 26,,, | Performed by: INTERNAL MEDICINE

## 2022-10-12 PROCEDURE — 93016 CV STRESS TEST SUPVJ ONLY: CPT | Mod: ,,, | Performed by: INTERNAL MEDICINE

## 2022-10-12 PROCEDURE — A9502 TC99M TETROFOSMIN: HCPCS

## 2022-10-12 PROCEDURE — 93306 ECHO (CUPID ONLY): ICD-10-PCS | Mod: 26,,, | Performed by: INTERNAL MEDICINE

## 2022-10-12 PROCEDURE — 93018 NUCLEAR STRESS TEST (CUPID ONLY): ICD-10-PCS | Mod: ,,, | Performed by: INTERNAL MEDICINE

## 2022-10-12 PROCEDURE — 78452 HT MUSCLE IMAGE SPECT MULT: CPT | Mod: 26,,, | Performed by: STUDENT IN AN ORGANIZED HEALTH CARE EDUCATION/TRAINING PROGRAM

## 2022-10-12 PROCEDURE — 93922 UPR/L XTREMITY ART 2 LEVELS: CPT

## 2022-10-12 PROCEDURE — 93018 CV STRESS TEST I&R ONLY: CPT | Mod: ,,, | Performed by: INTERNAL MEDICINE

## 2022-10-12 PROCEDURE — 93017 CV STRESS TEST TRACING ONLY: CPT

## 2022-10-12 PROCEDURE — 93922 UPR/L XTREMITY ART 2 LEVELS: CPT | Mod: 26,,, | Performed by: INTERNAL MEDICINE

## 2022-10-12 RX ORDER — REGADENOSON 0.08 MG/ML
0.4 INJECTION, SOLUTION INTRAVENOUS ONCE
Status: COMPLETED | OUTPATIENT
Start: 2022-10-12 | End: 2022-10-12

## 2022-10-12 RX ADMIN — REGADENOSON 0.4 MG: 0.08 INJECTION, SOLUTION INTRAVENOUS at 12:10

## 2022-10-13 LAB
LEFT ABI: 1.57
LEFT ARM BP: 143 MMHG
LEFT DORSALIS PEDIS: 255 MMHG
LEFT POSTERIOR TIBIAL: 255 MMHG
RIGHT ABI: 1.57
RIGHT ARM BP: 162 MMHG
RIGHT DORSALIS PEDIS: 255 MMHG
RIGHT POSTERIOR TIBIAL: 255 MMHG

## 2025-03-24 NOTE — TELEPHONE ENCOUNTER
----- Message from Fausto Merritt sent at 8/27/2020  9:57 AM CDT -----  Regarding: [facility]/Ms.Jackie Mcpherson called in to speak with someone at the office regarding Mr.Gravely medical needs. She would like a call back from the office and can be reached at    [phone]  Ext:[phone]    
Message left for Ms. Aguilar to call back concerning the patient.  
24-Mar-2025 20:44